# Patient Record
Sex: FEMALE | HISPANIC OR LATINO | Employment: FULL TIME | URBAN - METROPOLITAN AREA
[De-identification: names, ages, dates, MRNs, and addresses within clinical notes are randomized per-mention and may not be internally consistent; named-entity substitution may affect disease eponyms.]

---

## 2018-06-06 ENCOUNTER — APPOINTMENT (OUTPATIENT)
Dept: RADIOLOGY | Facility: IMAGING CENTER | Age: 48
End: 2018-06-06
Attending: NURSE PRACTITIONER
Payer: COMMERCIAL

## 2018-06-06 ENCOUNTER — OFFICE VISIT (OUTPATIENT)
Dept: MEDICAL GROUP | Facility: CLINIC | Age: 48
End: 2018-06-06
Payer: COMMERCIAL

## 2018-06-06 VITALS
BODY MASS INDEX: 26.75 KG/M2 | HEIGHT: 63 IN | TEMPERATURE: 99.8 F | WEIGHT: 151 LBS | SYSTOLIC BLOOD PRESSURE: 110 MMHG | OXYGEN SATURATION: 98 % | HEART RATE: 100 BPM | DIASTOLIC BLOOD PRESSURE: 68 MMHG | RESPIRATION RATE: 16 BRPM

## 2018-06-06 DIAGNOSIS — R05.9 COUGHING: ICD-10-CM

## 2018-06-06 DIAGNOSIS — H66.003 ACUTE SUPPURATIVE OTITIS MEDIA OF BOTH EARS WITHOUT SPONTANEOUS RUPTURE OF TYMPANIC MEMBRANES, RECURRENCE NOT SPECIFIED: ICD-10-CM

## 2018-06-06 DIAGNOSIS — J40 BRONCHITIS: ICD-10-CM

## 2018-06-06 PROCEDURE — 99213 OFFICE O/P EST LOW 20 MIN: CPT | Performed by: NURSE PRACTITIONER

## 2018-06-06 PROCEDURE — 71046 X-RAY EXAM CHEST 2 VIEWS: CPT | Mod: TC | Performed by: NURSE PRACTITIONER

## 2018-06-06 RX ORDER — PROMETHAZINE HYDROCHLORIDE AND CODEINE PHOSPHATE 6.25; 1 MG/5ML; MG/5ML
5 SYRUP ORAL 4 TIMES DAILY PRN
COMMUNITY
End: 2018-11-27

## 2018-06-06 RX ORDER — BENZONATATE 100 MG/1
100 CAPSULE ORAL 3 TIMES DAILY PRN
COMMUNITY
End: 2018-11-27

## 2018-06-06 RX ORDER — ALBUTEROL SULFATE 90 UG/1
2 AEROSOL, METERED RESPIRATORY (INHALATION) EVERY 6 HOURS PRN
Qty: 8.5 G | Refills: 0 | Status: SHIPPED | OUTPATIENT
Start: 2018-06-06 | End: 2018-11-27

## 2018-06-06 RX ORDER — METHYLPREDNISOLONE 4 MG/1
TABLET ORAL
Qty: 21 TAB | Refills: 0 | Status: SHIPPED | OUTPATIENT
Start: 2018-06-06 | End: 2018-11-27

## 2018-06-06 RX ORDER — AMOXICILLIN AND CLAVULANATE POTASSIUM 875; 125 MG/1; MG/1
1 TABLET, FILM COATED ORAL 2 TIMES DAILY
Qty: 20 TAB | Refills: 0 | Status: SHIPPED | OUTPATIENT
Start: 2018-06-06 | End: 2018-06-16

## 2018-06-06 RX ORDER — ALBUTEROL SULFATE 90 UG/1
2 AEROSOL, METERED RESPIRATORY (INHALATION) EVERY 6 HOURS PRN
Qty: 8.5 G | Refills: 0 | Status: SHIPPED | OUTPATIENT
Start: 2018-06-06 | End: 2018-06-06

## 2018-06-06 RX ORDER — AZITHROMYCIN 250 MG/1
250 TABLET, FILM COATED ORAL DAILY
COMMUNITY
End: 2018-06-06

## 2018-06-06 ASSESSMENT — ENCOUNTER SYMPTOMS
WHEEZING: 0
CHILLS: 0
HEADACHES: 1
SINUS PAIN: 0
SPUTUM PRODUCTION: 1
EYE PAIN: 0
FEVER: 1
SORE THROAT: 0
SHORTNESS OF BREATH: 1
DIZZINESS: 1
COUGH: 1

## 2018-06-06 NOTE — LETTER
[facility]  Alphonso Lowe M.D.  [address]  [place] [address]  Fax: [phone]   Authorization for Release/Disclosure of   Protected Health Information   Name: COLTON MELENDEZ : 1970 SSN: [id number]   Address: [address] [place] [address] Phone:    [phone] (home)    I authorize the entity listed below to release/disclose the PHI below to:   [facility]/Alphonso Lowe M.D. and TIEN Feldman   Provider or Entity Name:[facility]      Address [facility]  [address] [place] [address]      Phone:  [phone]    Fax:  [phone]   Reason for request: continuity of care   Information to be released:    [  ] LAST COLONOSCOPY,  including any PATH REPORT and follow-up  [  ] LAST FIT/COLOGUARD RESULT [  ] LAST DEXA  [  ] LAST MAMMOGRAM  [  ] LAST PAP  [  ] LAST LABS [  ] RETINA EXAM REPORT  [  ] IMMUNIZATION RECORDS  [ X ] Release all info      [  ] Check here and initial the line next to each item to release ALL health information INCLUDING  _____ Care and treatment for drug and / or alcohol abuse  _____ HIV testing, infection status, or AIDS  _____ Genetic Testing    DATES OF SERVICE OR TIME PERIOD TO BE DISCLOSED: 2018  I understand and acknowledge that:  * This Authorization may be revoked at any time by you in writing, except if your health information has already been used or disclosed.  * Your health information that will be used or disclosed as a result of you signing this authorization could be re-disclosed by the recipient. If this occurs, your re-disclosed health information may no longer be protected by State or Federal laws.  * You may refuse to sign this Authorization. Your refusal will not affect your ability to obtain treatment.  * This Authorization becomes effective upon signing and will  on (date) __________.      If no date is indicated, this Authorization will  one (1) year from the signature date.    Name:  Gely Cottrell    Signature:   Date:     6/6/2018       PLEASE FAX REQUESTED RECORDS BACK TO: [phone]

## 2018-06-06 NOTE — PROGRESS NOTES
Chief Complaint   Patient presents with   • URI     fever/non-productive cough/bilateral ear pain/sore throat/balance problem        HISTORY OF PRESENT ILLNESS: Patient is a 47 y.o. female established patient who presents today due to coughing for 6 weeks, mostly non-productive coughing with new symptoms of bilateral ear pain and sore throat, headache. Pt reports she has been to ER twice at [facility] and put on two course of antibiotics. She does not remember what is the first one but she is going to finish the second one, azithromycin today. Pt is also given night time codeine coughing medicine. She still has some but she does not feel it helps a lot.      Patient Active Problem List    Diagnosis Date Noted   • Abdominal pain, acute 08/27/2015   • Chest pain, rule out acute myocardial infarction 08/27/2015   • Diffuse pain 08/27/2015   • Hypocalcemia 08/27/2015   • Hyperparathyroidism due to vitamin D deficiency (HCC) 08/27/2015   • GERD (gastroesophageal reflux disease) 08/29/2011   • RUQ abdominal pain 08/29/2011   • History of cholecystectomy 08/29/2011       Allergies:Patient has no known allergies.    Current Outpatient Prescriptions   Medication Sig Dispense Refill   • promethazine-codeine (PHENERGAN-CODEINE) 6.25-10 MG/5ML Syrup Take 5 mL by mouth 4 times a day as needed for Cough.     • benzonatate (TESSALON) 100 MG Cap Take 100 mg by mouth 3 times a day as needed for Cough.     • amoxicillin-clavulanate (AUGMENTIN) 875-125 MG Tab Take 1 Tab by mouth 2 times a day for 10 days. 20 Tab 0   • albuterol 108 (90 Base) MCG/ACT Aero Soln inhalation aerosol Inhale 2 Puffs by mouth every 6 hours as needed. 8.5 g 0   • MethylPREDNISolone (MEDROL DOSEPAK) 4 MG Tablet Therapy Pack As directed on the packaging label. 21 Tab 0   • ibuprofen (MOTRIN) 200 MG Tab Take 600 mg by mouth every 6 hours as needed.     • calcium citrate (CALCITRATE) 950 MG Tab Take 1 Tab by mouth every day. 100 Tab 3   • vitamin D,  "Ergocalciferol, (DRISDOL) 71731 UNITS Cap capsule Take 1 Cap by mouth every 7 days. 30 Cap 0   • Cholecalciferol 1000 UNIT Cap Take 1 Cap by mouth every day. 100 Cap 3   • ondansetron (ZOFRAN) 4 MG TABS Take 1 Tab by mouth every 8 hours as needed for Nausea/Vomiting. 20 Each 0   • acetaminophen/caffeine/butalbital 300-40-50 mg (FIORICET) -40 MG CAPS capsule Take 1 Cap by mouth every 6 hours as needed for Headache. 30 Cap 0     No current facility-administered medications for this visit.        Social History   Substance Use Topics   • Smoking status: Never Smoker   • Smokeless tobacco: Never Used   • Alcohol use No       Family Status   Relation Status   • Mother Alive   • Father Alive     Family History   Problem Relation Age of Onset   • Diabetes Mother    • Hypertension Mother    • Hyperlipidemia Mother    • Stroke Mother    • Heart Disease Mother 60   • Hypertension Father    • Cancer Paternal Uncle      several uncles iwth stomach cancer of some kind         ROS:  Review of Systems   Constitutional: Positive for fever (last episode yesterday). Negative for chills.   HENT: Positive for ear pain. Negative for ear discharge, sinus pain and sore throat.    Eyes: Negative for pain.   Respiratory: Positive for cough, sputum production (very few) and shortness of breath (when severe coughing). Negative for wheezing.    Neurological: Positive for dizziness and headaches (after severe coughing).        Objective:     Blood pressure 110/68, pulse 100, temperature 37.7 °C (99.8 °F), resp. rate 16, height 1.6 m (5' 3\"), weight 68.5 kg (151 lb), SpO2 98 %.     Physical Exam:  Physical Exam   Constitutional: She is oriented to person, place, and time and well-developed, well-nourished, and in no distress.   HENT:   Head: Normocephalic and atraumatic.   Right Ear: There is tenderness. Tympanic membrane is bulging. Tympanic membrane is not injected. A middle ear effusion is present.   Left Ear: There is tenderness. " Tympanic membrane is bulging. Tympanic membrane is not injected.  No middle ear effusion.   Nose: Right sinus exhibits frontal sinus tenderness. Right sinus exhibits no maxillary sinus tenderness. Left sinus exhibits frontal sinus tenderness. Left sinus exhibits no maxillary sinus tenderness.   Mouth/Throat: No oropharyngeal exudate, posterior oropharyngeal edema, posterior oropharyngeal erythema or tonsillar abscesses.   Eyes: Conjunctivae are normal.   Neck: Neck supple. No JVD present.   Cardiovascular: Regular rhythm.    Pulmonary/Chest: Effort normal. No respiratory distress. She has no wheezes. She has no rales.   Diminished overall but pt is unable to take deep breath due to coughing   Musculoskeletal: She exhibits no edema.   Neurological: She is alert and oriented to person, place, and time.   Vitals reviewed.        Assessment/Plan:  1. Coughing  - DX-CHEST-2 VIEWS; Future  No pulmonary consolidation    - Obtain Results: Other (see comment) (recent ER visit note); Obtain Results From: [facility]  - amoxicillin-clavulanate (AUGMENTIN) 875-125 MG Tab; Take 1 Tab by mouth 2 times a day for 10 days.  Dispense: 20 Tab; Refill: 0    2. Bronchitis  - Obtain Results: Other (see comment) (recent ER visit note); Obtain Results From: [facility]  - amoxicillin-clavulanate (AUGMENTIN) 875-125 MG Tab; Take 1 Tab by mouth 2 times a day for 10 days.  Dispense: 20 Tab; Refill: 0  - albuterol 108 (90 Base) MCG/ACT Aero Soln inhalation aerosol; Inhale 2 Puffs by mouth every 6 hours as needed.  Dispense: 8.5 g; Refill: 0  - MethylPREDNISolone (MEDROL DOSEPAK) 4 MG Tablet Therapy Pack; As directed on the packaging label.  Dispense: 21 Tab; Refill: 0  - consider PFT if continue coughing    3. Acute suppurative otitis media of both ears without spontaneous rupture of tympanic membranes, recurrence not specified; sinusitis with sinus headache  - amoxicillin-clavulanate (AUGMENTIN) 875-125 MG Tab; Take 1 Tab by mouth 2  times a day for 10 days.  Dispense: 20 Tab; Refill: 0    Differential diagnoses and indications for immediate follow-up discussed with patient.    Instructed to return to clinic or nearest emergency department for any change in condition, further concerns, or worsening of symptoms.    The patient demonstrated a good understanding and agreed with the treatment plan.

## 2018-11-27 ENCOUNTER — HOSPITAL ENCOUNTER (EMERGENCY)
Facility: MEDICAL CENTER | Age: 48
End: 2018-11-27
Attending: EMERGENCY MEDICINE
Payer: COMMERCIAL

## 2018-11-27 ENCOUNTER — APPOINTMENT (OUTPATIENT)
Dept: RADIOLOGY | Facility: MEDICAL CENTER | Age: 48
End: 2018-11-27
Attending: EMERGENCY MEDICINE
Payer: COMMERCIAL

## 2018-11-27 VITALS
DIASTOLIC BLOOD PRESSURE: 67 MMHG | RESPIRATION RATE: 16 BRPM | BODY MASS INDEX: 25.93 KG/M2 | OXYGEN SATURATION: 98 % | HEIGHT: 64 IN | HEART RATE: 62 BPM | WEIGHT: 151.9 LBS | TEMPERATURE: 97.9 F | SYSTOLIC BLOOD PRESSURE: 114 MMHG

## 2018-11-27 DIAGNOSIS — N92.0 MENORRHAGIA WITH REGULAR CYCLE: ICD-10-CM

## 2018-11-27 DIAGNOSIS — R10.9 ABDOMINAL PAIN, ACUTE: ICD-10-CM

## 2018-11-27 LAB
ALBUMIN SERPL BCP-MCNC: 4.3 G/DL (ref 3.2–4.9)
ALBUMIN/GLOB SERPL: 1.6 G/DL
ALP SERPL-CCNC: 61 U/L (ref 30–99)
ALT SERPL-CCNC: 11 U/L (ref 2–50)
ANION GAP SERPL CALC-SCNC: 9 MMOL/L (ref 0–11.9)
APPEARANCE UR: CLEAR
AST SERPL-CCNC: 13 U/L (ref 12–45)
BACTERIA #/AREA URNS HPF: ABNORMAL /HPF
BASOPHILS # BLD AUTO: 0.5 % (ref 0–1.8)
BASOPHILS # BLD: 0.04 K/UL (ref 0–0.12)
BILIRUB SERPL-MCNC: 0.4 MG/DL (ref 0.1–1.5)
BILIRUB UR QL STRIP.AUTO: NEGATIVE
BUN SERPL-MCNC: 14 MG/DL (ref 8–22)
CALCIUM SERPL-MCNC: 9.1 MG/DL (ref 8.5–10.5)
CHLORIDE SERPL-SCNC: 105 MMOL/L (ref 96–112)
CO2 SERPL-SCNC: 23 MMOL/L (ref 20–33)
COLOR UR: YELLOW
CREAT SERPL-MCNC: 0.75 MG/DL (ref 0.5–1.4)
EOSINOPHIL # BLD AUTO: 0.14 K/UL (ref 0–0.51)
EOSINOPHIL NFR BLD: 1.6 % (ref 0–6.9)
EPI CELLS #/AREA URNS HPF: ABNORMAL /HPF
ERYTHROCYTE [DISTWIDTH] IN BLOOD BY AUTOMATED COUNT: 45 FL (ref 35.9–50)
GLOBULIN SER CALC-MCNC: 2.7 G/DL (ref 1.9–3.5)
GLUCOSE SERPL-MCNC: 94 MG/DL (ref 65–99)
GLUCOSE UR STRIP.AUTO-MCNC: NEGATIVE MG/DL
HCG SERPL QL: NEGATIVE
HCT VFR BLD AUTO: 40.8 % (ref 37–47)
HGB BLD-MCNC: 14.1 G/DL (ref 12–16)
HYALINE CASTS #/AREA URNS LPF: ABNORMAL /LPF
IMM GRANULOCYTES # BLD AUTO: 0.03 K/UL (ref 0–0.11)
IMM GRANULOCYTES NFR BLD AUTO: 0.3 % (ref 0–0.9)
KETONES UR STRIP.AUTO-MCNC: ABNORMAL MG/DL
LEUKOCYTE ESTERASE UR QL STRIP.AUTO: ABNORMAL
LYMPHOCYTES # BLD AUTO: 2.41 K/UL (ref 1–4.8)
LYMPHOCYTES NFR BLD: 27.7 % (ref 22–41)
MCH RBC QN AUTO: 32 PG (ref 27–33)
MCHC RBC AUTO-ENTMCNC: 34.6 G/DL (ref 33.6–35)
MCV RBC AUTO: 92.5 FL (ref 81.4–97.8)
MICRO URNS: ABNORMAL
MONOCYTES # BLD AUTO: 0.37 K/UL (ref 0–0.85)
MONOCYTES NFR BLD AUTO: 4.3 % (ref 0–13.4)
NEUTROPHILS # BLD AUTO: 5.7 K/UL (ref 2–7.15)
NEUTROPHILS NFR BLD: 65.6 % (ref 44–72)
NITRITE UR QL STRIP.AUTO: NEGATIVE
NRBC # BLD AUTO: 0 K/UL
NRBC BLD-RTO: 0 /100 WBC
PH UR STRIP.AUTO: 5 [PH]
PLATELET # BLD AUTO: 317 K/UL (ref 164–446)
PMV BLD AUTO: 9.7 FL (ref 9–12.9)
POTASSIUM SERPL-SCNC: 3.5 MMOL/L (ref 3.6–5.5)
PROT SERPL-MCNC: 7 G/DL (ref 6–8.2)
PROT UR QL STRIP: NEGATIVE MG/DL
RBC # BLD AUTO: 4.41 M/UL (ref 4.2–5.4)
RBC # URNS HPF: >150 /HPF
RBC UR QL AUTO: ABNORMAL
SODIUM SERPL-SCNC: 137 MMOL/L (ref 135–145)
SP GR UR STRIP.AUTO: 1.02
UROBILINOGEN UR STRIP.AUTO-MCNC: 0.2 MG/DL
WBC # BLD AUTO: 8.7 K/UL (ref 4.8–10.8)
WBC #/AREA URNS HPF: ABNORMAL /HPF

## 2018-11-27 PROCEDURE — 85025 COMPLETE CBC W/AUTO DIFF WBC: CPT

## 2018-11-27 PROCEDURE — 81001 URINALYSIS AUTO W/SCOPE: CPT

## 2018-11-27 PROCEDURE — 700102 HCHG RX REV CODE 250 W/ 637 OVERRIDE(OP): Performed by: EMERGENCY MEDICINE

## 2018-11-27 PROCEDURE — 99284 EMERGENCY DEPT VISIT MOD MDM: CPT

## 2018-11-27 PROCEDURE — 76830 TRANSVAGINAL US NON-OB: CPT

## 2018-11-27 PROCEDURE — A9270 NON-COVERED ITEM OR SERVICE: HCPCS | Performed by: EMERGENCY MEDICINE

## 2018-11-27 PROCEDURE — 80053 COMPREHEN METABOLIC PANEL: CPT

## 2018-11-27 PROCEDURE — 84703 CHORIONIC GONADOTROPIN ASSAY: CPT

## 2018-11-27 RX ORDER — HYDROCODONE BITARTRATE AND ACETAMINOPHEN 5; 325 MG/1; MG/1
1-2 TABLET ORAL EVERY 6 HOURS PRN
Qty: 12 TAB | Refills: 0 | Status: SHIPPED | OUTPATIENT
Start: 2018-11-27 | End: 2018-11-30

## 2018-11-27 RX ORDER — ACETAMINOPHEN 325 MG/1
650 TABLET ORAL ONCE
Status: COMPLETED | OUTPATIENT
Start: 2018-11-27 | End: 2018-11-27

## 2018-11-27 RX ORDER — HYDROCODONE BITARTRATE AND ACETAMINOPHEN 5; 325 MG/1; MG/1
1 TABLET ORAL ONCE
Status: COMPLETED | OUTPATIENT
Start: 2018-11-27 | End: 2018-11-27

## 2018-11-27 RX ADMIN — HYDROCODONE BITARTRATE AND ACETAMINOPHEN 1 TABLET: 5; 325 TABLET ORAL at 18:12

## 2018-11-27 RX ADMIN — ACETAMINOPHEN 650 MG: 325 TABLET, FILM COATED ORAL at 16:54

## 2018-11-27 ASSESSMENT — PAIN SCALES - GENERAL
PAINLEVEL_OUTOF10: 5
PAINLEVEL_OUTOF10: 7
PAINLEVEL_OUTOF10: 7

## 2018-11-28 NOTE — ED NOTES
Break RN: Medicated per MAR. States her daughter will be coming to pick her up. Verbalizes understanding of discharge and followup instructions. PIV removed, VSS. Given Rx and signed narcotic consent form. Ambulates with steady gait to discharge.

## 2018-11-28 NOTE — ED PROVIDER NOTES
"ED Provider Note    Scribed for Macario Rowland M.D. by Perez Jordan. 11/27/2018, 4:41 PM.    Primary care provider: Alphonso Lowe M.D.  Means of arrival: Walk-in  History obtained from: Patient  History limited by: None    CHIEF COMPLAINT  Chief Complaint   Patient presents with   • Vaginal Bleeding     *11 days. \"The toilet is full of blood and clots and it's not stopping.\"   • Abdominal Pain     \"Something in my vagina is pulling down.\"       HPI  Gely Cottrell is a 48 y.o. female who presents to the Emergency Department complaining of vaginal bleeding with associated lower abdominal pain onset 12 days ago. She states she is going through about 1 pad per hour. She believes the pain began first with gradual onset, followed by blood with large clots. She reports associated headache, dizziness, and light-headedness. She states her dizziness is exacerbated by sitting up. She denies any dysuria, dark or bloody stool, bleeding gums, or recent irregular or missed periods. She states she does not have an established OBGYN.    REVIEW OF SYSTEMS  Pertinent positives include vaginal bleeding, lower abdominal pain, headache, dizziness, and light-headedness. Pertinent negatives include no dysuria, dark or bloody stool, bleeding gums, or recent irregular or missed periods. All other systems negative.    PAST MEDICAL HISTORY   has a past medical history of Anemia (2002); GERD (gastroesophageal reflux disease) (8/29/2011); and RUQ abdominal pain (8/29/2011).    SURGICAL HISTORY   has a past surgical history that includes appendectomy; cholecystectomy; primary c section; tubal coagulation laparoscopic bilateral; and lumpectomy.    SOCIAL HISTORY  Social History   Substance Use Topics   • Smoking status: Never Smoker   • Smokeless tobacco: Never Used   • Alcohol use No      History   Drug Use No       FAMILY HISTORY  Family History   Problem Relation Age of Onset   • Diabetes Mother    • Hypertension Mother    • " "Hyperlipidemia Mother    • Stroke Mother    • Heart Disease Mother 60   • Hypertension Father    • Cancer Paternal Uncle         several uncles iwth stomach cancer of some kind       CURRENT MEDICATIONS  Home Medications     Reviewed by Ofelia Montes De Oca R.N. (Registered Nurse) on 11/27/18 at 1618  Med List Status: Complete   Medication Last Dose Status   ibuprofen (MOTRIN) 200 MG Tab 11/27/2018 Active                ALLERGIES  No Known Allergies    PHYSICAL EXAM  VITAL SIGNS: /80   Pulse 82   Temp 36.6 °C (97.9 °F) (Temporal)   Resp 16   Ht 1.626 m (5' 4\")   Wt 68.9 kg (151 lb 14.4 oz)   LMP 11/27/2018 (Exact Date)   SpO2 95%   BMI 26.07 kg/m²     Constitutional: Well developed, Well nourished, mild distress.   HENT: Normocephalic, Atraumatic.   Eyes: Conjunctiva normal, No discharge.   Cardiovascular: Normal heart rate, Normal rhythm, No murmurs, equal pulses.   Pulmonary: Normal breath sounds, No respiratory distress, No wheezing, No rales, No rhonchi.  Abdomen: Slight epigastric tenderness to palpation. Moderate suprapubic tenderness with slight guarding, no rebound.   : Female chaperone present during pelvic exam.  External exam is normal.  Patient has some clots in the vaginal vault, cervical ossea is closed and there is bleeding from the cervical office is dark in nature.  Patient has slight tenderness throughout no masses are felt  back: No CVA tenderness.   Musculoskeletal: No major deformities noted, No tenderness.   Skin: Warm, Dry, No erythema, No rash.   Neurologic: Alert & oriented x 3, Normal motor function,  No focal deficits noted.   Psychiatric: Affect normal, Judgment normal, Mood normal.     LABS  Results for orders placed or performed during the hospital encounter of 11/27/18   HCG Qual Serum   Result Value Ref Range    Beta-Hcg Qualitative Serum Negative Negative   CBC WITH DIFFERENTIAL   Result Value Ref Range    WBC 8.7 4.8 - 10.8 K/uL    RBC 4.41 4.20 - 5.40 M/uL    Hemoglobin " 14.1 12.0 - 16.0 g/dL    Hematocrit 40.8 37.0 - 47.0 %    MCV 92.5 81.4 - 97.8 fL    MCH 32.0 27.0 - 33.0 pg    MCHC 34.6 33.6 - 35.0 g/dL    RDW 45.0 35.9 - 50.0 fL    Platelet Count 317 164 - 446 K/uL    MPV 9.7 9.0 - 12.9 fL    Neutrophils-Polys 65.60 44.00 - 72.00 %    Lymphocytes 27.70 22.00 - 41.00 %    Monocytes 4.30 0.00 - 13.40 %    Eosinophils 1.60 0.00 - 6.90 %    Basophils 0.50 0.00 - 1.80 %    Immature Granulocytes 0.30 0.00 - 0.90 %    Nucleated RBC 0.00 /100 WBC    Neutrophils (Absolute) 5.70 2.00 - 7.15 K/uL    Lymphs (Absolute) 2.41 1.00 - 4.80 K/uL    Monos (Absolute) 0.37 0.00 - 0.85 K/uL    Eos (Absolute) 0.14 0.00 - 0.51 K/uL    Baso (Absolute) 0.04 0.00 - 0.12 K/uL    Immature Granulocytes (abs) 0.03 0.00 - 0.11 K/uL    NRBC (Absolute) 0.00 K/uL   COMP METABOLIC PANEL   Result Value Ref Range    Sodium 137 135 - 145 mmol/L    Potassium 3.5 (L) 3.6 - 5.5 mmol/L    Chloride 105 96 - 112 mmol/L    Co2 23 20 - 33 mmol/L    Anion Gap 9.0 0.0 - 11.9    Glucose 94 65 - 99 mg/dL    Bun 14 8 - 22 mg/dL    Creatinine 0.75 0.50 - 1.40 mg/dL    Calcium 9.1 8.5 - 10.5 mg/dL    AST(SGOT) 13 12 - 45 U/L    ALT(SGPT) 11 2 - 50 U/L    Alkaline Phosphatase 61 30 - 99 U/L    Total Bilirubin 0.4 0.1 - 1.5 mg/dL    Albumin 4.3 3.2 - 4.9 g/dL    Total Protein 7.0 6.0 - 8.2 g/dL    Globulin 2.7 1.9 - 3.5 g/dL    A-G Ratio 1.6 g/dL   URINALYSIS (UA)   Result Value Ref Range    Color Yellow     Character Clear     Specific Gravity 1.023 <1.035    Ph 5.0 5.0 - 8.0    Glucose Negative Negative mg/dL    Ketones Trace (A) Negative mg/dL    Protein Negative Negative mg/dL    Bilirubin Negative Negative    Urobilinogen, Urine 0.2 Negative    Nitrite Negative Negative    Leukocyte Esterase Trace (A) Negative    Occult Blood Large (A) Negative    Micro Urine Req Microscopic    URINE MICROSCOPIC (W/UA)   Result Value Ref Range    WBC 2-5 /hpf    RBC >150 (A) /hpf    Bacteria Few (A) None /hpf    Epithelial Cells Few /hpf     Hyaline Cast 3-5 (A) /lpf   ESTIMATED GFR   Result Value Ref Range    GFR If African American >60 >60 mL/min/1.73 m 2    GFR If Non African American >60 >60 mL/min/1.73 m 2      All labs reviewed by me.    RADIOLOGY  US-PELVIC TRANSVAGINAL ONLY   Final Result      Endometrial stripe measuring 6 mm in width.      No evidence of adnexal mass.        The radiologist's interpretation of all radiological studies have been reviewed by me.    COURSE & MEDICAL DECISION MAKING  Pertinent Labs & Imaging studies reviewed. (See chart for details)    4:41 PM - Patient seen and examined at bedside. Patient will be treated with Tylenol 650 mg. Ordered US transvaginal, HCG Qual, CBC, CMP, UA to evaluate her symptoms. The differential diagnoses include but are not limited to: ectopic pregnancy, irregular vaginal bleeding, leiomyoma.     Medical Decision Making: At this point time I think the patient most likely has abnormal vaginal bleeding secondary to possible perimenopause.  Ultrasound is negative for torsion or fibroids.  She is not having significant vaginal bleeding to the point to require any transfusion.  I have discussed with her that she needs a follow-up with a gynecologist and have referred her to the OB/GYN on call.  Patient does not have a fever no elevation of white blood cell count no signs of urinary tract infection.    I reviewed prescription monitoring program for patient's narcotic use before prescribing a scheduled drug.The patient will not drink alcohol nor drive with prescribed medications. The patient will return for new or worsening symptoms and is stable at the time of discharge.    The patient is referred to a primary physician for blood pressure management, diabetic screening, and for all other preventative health concerns.    In prescribing controlled substances to this patient, I certify that I have obtained and reviewed the medical history of Gely Cottrell. I have also made a good rachael effort  to obtain applicable records from other providers who have treated the patient and records did not demonstrate any increased risk of substance abuse that would prevent me from prescribing controlled substances.     I have conducted a physical exam and documented it. I have reviewed Ms. Cottrell’s prescription history as maintained by the [place] Prescription Monitoring Program.     I have assessed the patient’s risk for abuse, dependency, and addiction using the validated Opioid Risk Tool available at https://www.mdcalc.com/fminvu-cpee-sqph-ort-narcotic-abuse. 0  Given the above, I believe the benefits of controlled substance therapy outweigh the risks. The reasons for prescribing controlled substances include non-narcotic, oral analgesic alternatives have been inadequate for pain control. Accordingly, I have discussed the risk and benefits, treatment plan, and alternative therapies with the patient.         DISPOSITION:  Patient will be discharged home in stable condition.    FOLLOW UP:  Alphonso Lowe M.D.  [address]  [address]  [place] [address] [phone]    Schedule an appointment as soon as possible for a visit in 3 days      Maya Kong M.D.  [address]  [place] [address]  [phone]    Schedule an appointment as soon as possible for a visit         OUTPATIENT MEDICATIONS:  Discharge Medication List as of 11/27/2018  6:15 PM      START taking these medications    Details   HYDROcodone-acetaminophen (NORCO) 5-325 MG Tab per tablet Take 1-2 Tabs by mouth every 6 hours as needed for up to 3 days., Disp-12 Tab, R-0, Print Rx Paper, For 3 days               FINAL IMPRESSION  1. Menorrhagia with regular cycle    2. Abdominal pain, acute          IPerez (Cassandra), am scribing for, and in the presence of, Macario Rowland M.D.    Electronically signed by: Perez Kelly), 11/27/2018    Macario FLORENTINO M.D. personally performed the services described in  this documentation, as scribed by Perez Jordan in my presence, and it is both accurate and complete. C.    The note accurately reflects work and decisions made by me.  Macario Rowland  11/27/2018  7:04 PM

## 2018-11-28 NOTE — DISCHARGE INSTRUCTIONS
Return the emergency department if you have increasing pain, fever, heavy vaginal bleeding greater than a pad an hour, lightheadedness or pass out.

## 2018-11-28 NOTE — ED TRIAGE NOTES
"  Chief Complaint   Patient presents with   • Vaginal Bleeding     *11 days. \"The toilet is full of blood and clots and it's not stopping.\"   • Abdominal Pain     \"Something in my vagina is pulling down.\"     Ambulatory to triage for above.  Patient states that she is not on birth control and is sexually active.  LMP before this episode of bleeding was Oct 11.     /80   Pulse 82   Temp 36.6 °C (97.9 °F) (Temporal)   Resp 16   Ht 1.626 m (5' 4\")   Wt 68.9 kg (151 lb 14.4 oz)   LMP 11/27/2018 (Exact Date)   SpO2 95%   BMI 26.07 kg/m²     "

## 2020-06-17 ENCOUNTER — OFFICE VISIT (OUTPATIENT)
Dept: URGENT CARE | Facility: PHYSICIAN GROUP | Age: 50
End: 2020-06-17
Payer: COMMERCIAL

## 2020-06-17 ENCOUNTER — TELEPHONE (OUTPATIENT)
Dept: MEDICAL GROUP | Facility: MEDICAL CENTER | Age: 50
End: 2020-06-17

## 2020-06-17 ENCOUNTER — APPOINTMENT (OUTPATIENT)
Dept: RADIOLOGY | Facility: IMAGING CENTER | Age: 50
End: 2020-06-17
Attending: PHYSICIAN ASSISTANT
Payer: COMMERCIAL

## 2020-06-17 VITALS
OXYGEN SATURATION: 98 % | WEIGHT: 169.8 LBS | TEMPERATURE: 98.2 F | BODY MASS INDEX: 28.99 KG/M2 | HEART RATE: 84 BPM | DIASTOLIC BLOOD PRESSURE: 72 MMHG | SYSTOLIC BLOOD PRESSURE: 100 MMHG | RESPIRATION RATE: 20 BRPM | HEIGHT: 64 IN

## 2020-06-17 DIAGNOSIS — R05.9 COUGH: ICD-10-CM

## 2020-06-17 DIAGNOSIS — J98.4 PNEUMONITIS: ICD-10-CM

## 2020-06-17 PROCEDURE — 71046 X-RAY EXAM CHEST 2 VIEWS: CPT | Mod: TC | Performed by: PHYSICIAN ASSISTANT

## 2020-06-17 PROCEDURE — 99214 OFFICE O/P EST MOD 30 MIN: CPT | Performed by: PHYSICIAN ASSISTANT

## 2020-06-17 RX ORDER — DOXYCYCLINE HYCLATE 100 MG
100 TABLET ORAL 2 TIMES DAILY
Qty: 14 TAB | Refills: 0 | Status: SHIPPED | OUTPATIENT
Start: 2020-06-17 | End: 2020-06-24

## 2020-06-17 RX ORDER — PROMETHAZINE HYDROCHLORIDE, PHENYLEPHRINE HYDROCHLORIDE AND CODEINE PHOSPHATE 6.25; 5; 1 MG/5ML; MG/5ML; MG/5ML
5 SOLUTION ORAL EVERY 6 HOURS PRN
Qty: 1 BOTTLE | Refills: 0 | Status: SHIPPED | OUTPATIENT
Start: 2020-06-17 | End: 2020-06-24

## 2020-06-17 RX ORDER — BENZONATATE 100 MG/1
100 CAPSULE ORAL 3 TIMES DAILY PRN
Qty: 60 CAP | Refills: 0 | Status: SHIPPED | OUTPATIENT
Start: 2020-06-17 | End: 2020-07-28

## 2020-06-17 RX ORDER — METHYLPREDNISOLONE 4 MG/1
TABLET ORAL
Qty: 21 TAB | Refills: 0 | Status: SHIPPED | OUTPATIENT
Start: 2020-06-17 | End: 2020-07-28

## 2020-06-17 RX ORDER — ALBUTEROL SULFATE 90 UG/1
2 AEROSOL, METERED RESPIRATORY (INHALATION) EVERY 6 HOURS PRN
Qty: 8.5 G | Refills: 0 | Status: SHIPPED | OUTPATIENT
Start: 2020-06-17 | End: 2020-07-28

## 2020-06-17 ASSESSMENT — FIBROSIS 4 INDEX: FIB4 SCORE: 0.59

## 2020-06-17 ASSESSMENT — ENCOUNTER SYMPTOMS
WEAKNESS: 0
SHORTNESS OF BREATH: 1
NAUSEA: 0
RHINORRHEA: 0
MYALGIAS: 0
DIZZINESS: 0
HEMOPTYSIS: 0
FEVER: 0
ABDOMINAL PAIN: 0
TINGLING: 0
WHEEZING: 0
HEADACHES: 1
VOMITING: 1
COUGH: 1
CHILLS: 0
SORE THROAT: 0
HEARTBURN: 0

## 2020-06-17 NOTE — TELEPHONE ENCOUNTER
1. Caller Name: Gely               Call Back Number: [phone]  [facility] PCP or Specialty Provider: No          2.  In the last two weeks, has the patient had any new or worsening symptoms (not explained by alternative diagnosis)? Yes, the patient reports the following COVID-19 consistent symptoms: cough, sore throat, congestion or runny nose and headache.  Reports with coughing episodes having a difficult time breathing. Denies feeling short of breath at this time. Denies any chest pain. Reports symptoms have been present for 2 weeks.    3.  Does patient have any comoribidities? None     4.  Has the patient traveled in the last 14 days OR had any known contact with someone who is suspected or confirmed to have COVID-19?  No.    5. Disposition: Advised to go to     Note routed to [facility] Provider: No [facility] PCP

## 2020-06-17 NOTE — LETTER
[facility] [place]  [address]  [place] [address]     June 17, 2020    Patient: Gely Cottrell   YOB: 1970   Date of Visit: 6/17/2020       To Whom It May Concern:    Gely Cottrell was seen and treated in our department on 6/17/2020.  Please excuse from work from 6/17/2020 until 6/19/2020.  Please call with any questions or concerns at [phone].    Sincerely,     Jeremías Wiggins P.A.-C.

## 2020-06-17 NOTE — PROGRESS NOTES
Subjective:   Gely Cottrell is a 49 y.o. female who presents for Cough (persistent cough, itchy throat, vomiting (foamy), difficulty sleeping, x2 weeks )      Cough   Chronicity: Started 4 weeks ago progressively worsened over the last 2 weeks.  The patient has a constant cough which is nonproductive.  Cough is worse with any deep breathing or talking. The problem has been gradually worsening. The problem occurs constantly. The cough is non-productive. Associated symptoms include ear congestion (Right greater than left.), headaches, postnasal drip and shortness of breath (After coughing fits.). Pertinent negatives include no chest pain, chills, ear pain, fever, heartburn, hemoptysis, myalgias, nasal congestion, rash, rhinorrhea, sore throat or wheezing. Exacerbated by: Deep breathing or talking. She has tried OTC cough suppressant for the symptoms. The treatment provided no relief. Her past medical history is significant for bronchitis, environmental allergies and pneumonia.       Review of Systems   Constitutional: Negative for chills and fever.   HENT: Positive for postnasal drip. Negative for ear pain, rhinorrhea and sore throat.    Respiratory: Positive for cough and shortness of breath (After coughing fits.). Negative for hemoptysis and wheezing.    Cardiovascular: Negative for chest pain.   Gastrointestinal: Positive for vomiting (Clear foamy consistency after coughing fits.). Negative for abdominal pain, heartburn and nausea.   Musculoskeletal: Negative for myalgias.   Skin: Negative for rash.   Neurological: Positive for headaches. Negative for dizziness, tingling and weakness.   Endo/Heme/Allergies: Positive for environmental allergies.       Medications:    • ibuprofen Tabs    Allergies: Patient has no known allergies.    Problem List: Gely Cottrell has GERD (gastroesophageal reflux disease); RUQ abdominal pain; History of cholecystectomy; Abdominal pain, acute; Chest pain, rule out acute  "myocardial infarction; Diffuse pain; Hypocalcemia; and Hyperparathyroidism due to vitamin D deficiency (HCC) on their problem list.    Surgical History:  Past Surgical History:   Procedure Laterality Date   • APPENDECTOMY     • CHOLECYSTECTOMY     • LUMPECTOMY      2003   • PRIMARY C SECTION      repeat c section time 2   • TUBAL COAGULATION LAPAROSCOPIC BILATERAL         Past Social Hx: Gely Cottrell  reports that she has never smoked. She has never used smokeless tobacco. She reports current alcohol use. She reports that she does not use drugs.     Past Family Hx:  Gely Cottrell family history includes Cancer in her paternal uncle; Diabetes in her mother; Heart Disease (age of onset: 60) in her mother; Hyperlipidemia in her mother; Hypertension in her father and mother; Stroke in her mother.     Problem list, medications, and allergies reviewed by myself today in Epic.     Objective:     /72 (BP Location: Right arm, Patient Position: Sitting, BP Cuff Size: Adult)   Pulse 84   Temp 36.8 °C (98.2 °F) (Temporal)   Resp 20   Ht 1.626 m (5' 4\")   Wt 77 kg (169 lb 12.8 oz)   SpO2 98%   BMI 29.15 kg/m²     Physical Exam  Constitutional:       General: She is not in acute distress.     Appearance: Normal appearance. She is ill-appearing. She is not toxic-appearing or diaphoretic.   HENT:      Head: Normocephalic and atraumatic.      Right Ear: Ear canal and external ear normal.      Left Ear: Ear canal and external ear normal.      Ears:      Comments: Clear fluid appreciated behind the bilateral TMs.  No TM erythema or bulging noted.     Nose: Congestion and rhinorrhea present.      Mouth/Throat:      Mouth: Mucous membranes are moist.      Pharynx: Posterior oropharyngeal erythema present. No oropharyngeal exudate.      Comments: Postnasal drip appreciated.  No tonsillar edema no tonsillar exudates noted uvula midline nondeviated.  Eyes:      Conjunctiva/sclera: Conjunctivae normal.   Neck:    "   Musculoskeletal: Normal range of motion. No muscular tenderness.   Cardiovascular:      Rate and Rhythm: Normal rate and regular rhythm.      Pulses: Normal pulses.      Heart sounds: Normal heart sounds.   Pulmonary:      Effort: Pulmonary effort is normal.      Breath sounds: Normal breath sounds. No wheezing.      Comments: Continuous coughing fits throughout patient interview.  Coughing with every deep breath.  Abdominal:      Palpations: Abdomen is soft.      Tenderness: There is no abdominal tenderness.   Lymphadenopathy:      Cervical: No cervical adenopathy.   Skin:     General: Skin is warm and dry.      Capillary Refill: Capillary refill takes less than 2 seconds.   Neurological:      General: No focal deficit present.      Mental Status: She is alert and oriented to person, place, and time. Mental status is at baseline.   Psychiatric:         Mood and Affect: Mood normal.         Thought Content: Thought content normal.       Chest x-ray:  FINDINGS:  There are patchy right peripheral midlung airspace opacities. The left lung is clear.     The cardiac silhouette is normal in size.     There is no evidence of pleural effusion or pneumothorax.     Imaged osseous structures are grossly unremarkable.     IMPRESSION:    Patchy right peripheral midlung airspace opacity suggestive of pneumonitis.    Assessment/Plan:     Diagnosis and associated orders:   1. Cough    - DX-CHEST-2 VIEWS; Future  - albuterol 108 (90 Base) MCG/ACT Aero Soln inhalation aerosol; Inhale 2 Puffs by mouth every 6 hours as needed for Shortness of Breath.  Dispense: 8.5 g; Refill: 0  - doxycycline (VIBRAMYCIN) 100 MG Tab; Take 1 Tab by mouth 2 times a day for 7 days.  Dispense: 14 Tab; Refill: 0  - Promethazine-Phenyleph-Codeine (PHENERGAN VC CODEINE) 6.25-5-10 MG/5ML Syrup; Take 5 mL by mouth every 6 hours as needed for up to 7 days.  Dispense: 1 Bottle; Refill: 0  - methylPREDNISolone (MEDROL DOSEPAK) 4 MG Tablet Therapy Pack; Follow  schedule on package instructions.  Dispense: 21 Tab; Refill: 0  - benzonatate (TESSALON) 100 MG Cap; Take 1 Cap by mouth 3 times a day as needed for Cough.  Dispense: 60 Cap; Refill: 0    2. Pneumonitis    - albuterol 108 (90 Base) MCG/ACT Aero Soln inhalation aerosol; Inhale 2 Puffs by mouth every 6 hours as needed for Shortness of Breath.  Dispense: 8.5 g; Refill: 0  - methylPREDNISolone (MEDROL DOSEPAK) 4 MG Tablet Therapy Pack; Follow schedule on package instructions.  Dispense: 21 Tab; Refill: 0  - benzonatate (TESSALON) 100 MG Cap; Take 1 Cap by mouth 3 times a day as needed for Cough.  Dispense: 60 Cap; Refill: 0       Comments/MDM:       • Take medications as prescribed.  • Contingent antibiotic prescription given to patient to fill upon meeting criteria of guidelines discussed.   • Increase fluid intake.  • After visit summary provided with home care instructions.  • Instructions given on cough medicine do not take promethazine with codeine while at work only take at night before bed.  May take Tessalon Perles during the day.  • Red flag symptoms discussed with the patient at length today.  She understands if any of these appear to return to clinic or the nearest emergency department.           Differential diagnosis, natural history, supportive care, and indications for immediate follow-up discussed.    Advised the patient to follow-up with the primary care physician for recheck, reevaluation, and consideration of further management.    Please note that this dictation was created using voice recognition software. I have made reasonable attempt to correct obvious errors, but I expect that there are errors of grammar and possibly content that I did not discover before finalizing the note.    This note was electronically signed by CHRIS Wiggins PA-C

## 2020-06-17 NOTE — PATIENT INSTRUCTIONS
Bronchitis  Bronchitis is the body's way of reacting to injury and/or infection (inflammation) of the bronchi. Bronchi are the air tubes that extend from the windpipe into the lungs. If the inflammation becomes severe, it may cause shortness of breath.  CAUSES   Inflammation may be caused by:  · A virus.  · Germs (bacteria).  · Dust.  · Allergens.  · Pollutants and many other irritants.  The cells lining the bronchial tree are covered with tiny hairs (cilia). These constantly beat upward, away from the lungs, toward the mouth. This keeps the lungs free of pollutants. When these cells become too irritated and are unable to do their job, mucus begins to develop. This causes the characteristic cough of bronchitis. The cough clears the lungs when the cilia are unable to do their job. Without either of these protective mechanisms, the mucus would settle in the lungs. Then you would develop pneumonia.  Smoking is a common cause of bronchitis and can contribute to pneumonia. Stopping this habit is the single most important thing you can do to help yourself.  TREATMENT   · Your caregiver may prescribe an antibiotic if the cough is caused by bacteria. Also, medicines that open up your airways make it easier to breathe. Your caregiver may also recommend or prescribe an expectorant. It will loosen the mucus to be coughed up. Only take over-the-counter or prescription medicines for pain, discomfort, or fever as directed by your caregiver.  · Removing whatever causes the problem (smoking, for example) is critical to preventing the problem from getting worse.  · Cough suppressants may be prescribed for relief of cough symptoms.  · Inhaled medicines may be prescribed to help with symptoms now and to help prevent problems from returning.  · For those with recurrent (chronic) bronchitis, there may be a need for steroid medicines.  SEEK IMMEDIATE MEDICAL CARE IF:   · During treatment, you develop more pus-like mucus (purulent  sputum).  · You have a fever.  · Your baby is older than 3 months with a rectal temperature of 102° F (38.9° C) or higher.  · Your baby is 3 months old or younger with a rectal temperature of 100.4° F (38° C) or higher.  · You become progressively more ill.  · You have increased difficulty breathing, wheezing, or shortness of breath.  It is necessary to seek immediate medical care if you are elderly or sick from any other disease.  MAKE SURE YOU:   · Understand these instructions.  · Will watch your condition.  · Will get help right away if you are not doing well or get worse.  Document Released: 12/18/2006 Document Revised: 03/11/2013 Document Reviewed: 10/27/2009  [employer]® Patient Information ©2014 [employer].  Pneumonitis  Pneumonitis is inflammation of the lungs.   CAUSES   Many things can cause pneumonitis. These can include:   · A bacterial or viral infection. Pneumonitis due to an infection is usually called pneumonia.  · Work-related exposures, including farm and industrial work. Some substances that can cause pneumonitis include asbestos, silica, inhaled acids, or inhaled chlorine gas.    · Repeated exposure to bird feathers, bird feces, or other allergens.    · Medicine such as chemotherapy drugs, certain antibiotics, and some heart medicines.    · Radiation therapy.    · Exposure to mold. A hot tub, sauna, or home humidifier can have mold growing in it, even if it looks clean. The mold can be breathed in through water vapor.  · Breathing (aspirating) stomach contents, food, or liquids into the lungs.    SIGNS AND SYMPTOMS   · Cough.    · Shortness of breath or difficulty breathing.    · Fever.    · Decreased energy.    · Decreased appetite.    DIAGNOSIS   To diagnose pneumonitis, your health care provider will do a complete history and physical exam. Various tests may be ordered, such as:   · Pulmonary function test.    · Chest X-ray.    · CT scan of the lungs.    · Bronchoscopy.    · Lung biopsy.     TREATMENT   Treatment will depend on the cause of the pneumonitis. If the cause is exposure to a substance, avoiding further exposure to that substance will help reduce your symptoms. Possible medical treatments for pneumonitis include:   · Corticosteroid medicine to help decrease inflammation in the lungs.    · Antibiotic medicine to help fight a bacterial lung infection.    · Oxygen therapy if you are having difficulty breathing.    HOME CARE INSTRUCTIONS   · Avoid exposure to any substance identified as the cause of your pneumonitis.    · If you must continue to work with substances that can cause pneumonitis, wear a mask to protect your lungs.    · Only take over-the-counter or prescription medicine as directed by your health care provider.    · Do not smoke.    · If you use inhalers, keep them with you at all times.    · Follow up with your health care provider as directed.    SEEK IMMEDIATE MEDICAL CARE IF:   · You develop new or increased shortness of breath.    · You develop a blue color (cyanosis) under your fingernails.    · You have a fever.    MAKE SURE YOU:   · Understand these instructions.  · Will watch your condition.  · Will get help right away if you are not doing well or get worse.  This information is not intended to replace advice given to you by your health care provider. Make sure you discuss any questions you have with your health care provider.  Document Released: 06/07/2011 Document Revised: 08/20/2014 Document Reviewed: 06/09/2014  [employer]vier Interactive Patient Education © 2017 [employer]

## 2020-06-19 ENCOUNTER — TELEPHONE (OUTPATIENT)
Dept: URGENT CARE | Facility: PHYSICIAN GROUP | Age: 50
End: 2020-06-19

## 2020-06-19 DIAGNOSIS — J98.4 PNEUMONITIS: ICD-10-CM

## 2020-06-19 RX ORDER — DEXTROMETHORPHAN HYDROBROMIDE AND PROMETHAZINE HYDROCHLORIDE 15; 6.25 MG/5ML; MG/5ML
5 SYRUP ORAL EVERY 6 HOURS PRN
Qty: 100 ML | Refills: 0 | Status: SHIPPED | OUTPATIENT
Start: 2020-06-19 | End: 2020-07-28

## 2020-06-19 NOTE — TELEPHONE ENCOUNTER
This patient called the clinic and the provider that saw the patient previously is not in today.  Pharmacy does not have the prescribed cough syrup.  I reviewed the patient's allergies as well as her chart and I have sent in promethazine and dextromethorphan cough syrup to the pharmacy.  The patient access representative spoke directly and the patient agreed with this plan of care.

## 2020-07-28 ENCOUNTER — OFFICE VISIT (OUTPATIENT)
Dept: URGENT CARE | Facility: PHYSICIAN GROUP | Age: 50
End: 2020-07-28
Payer: COMMERCIAL

## 2020-07-28 VITALS
RESPIRATION RATE: 18 BRPM | TEMPERATURE: 98.1 F | SYSTOLIC BLOOD PRESSURE: 98 MMHG | HEIGHT: 64 IN | WEIGHT: 173.2 LBS | BODY MASS INDEX: 29.57 KG/M2 | OXYGEN SATURATION: 99 % | HEART RATE: 98 BPM | DIASTOLIC BLOOD PRESSURE: 80 MMHG

## 2020-07-28 DIAGNOSIS — R11.2 NON-INTRACTABLE VOMITING WITH NAUSEA, UNSPECIFIED VOMITING TYPE: ICD-10-CM

## 2020-07-28 DIAGNOSIS — R10.9 ABDOMINAL PAIN, UNSPECIFIED ABDOMINAL LOCATION: ICD-10-CM

## 2020-07-28 DIAGNOSIS — R07.89 CHEST PRESSURE: ICD-10-CM

## 2020-07-28 LAB

## 2020-07-28 PROCEDURE — 99215 OFFICE O/P EST HI 40 MIN: CPT | Mod: 25 | Performed by: PHYSICIAN ASSISTANT

## 2020-07-28 PROCEDURE — 81002 URINALYSIS NONAUTO W/O SCOPE: CPT | Performed by: PHYSICIAN ASSISTANT

## 2020-07-28 PROCEDURE — 93000 ELECTROCARDIOGRAM COMPLETE: CPT | Performed by: PHYSICIAN ASSISTANT

## 2020-07-28 PROCEDURE — 81025 URINE PREGNANCY TEST: CPT | Performed by: PHYSICIAN ASSISTANT

## 2020-07-28 RX ORDER — ONDANSETRON 4 MG/1
4 TABLET, ORALLY DISINTEGRATING ORAL ONCE
Status: COMPLETED | OUTPATIENT
Start: 2020-07-28 | End: 2020-07-28

## 2020-07-28 RX ADMIN — ONDANSETRON 4 MG: 4 TABLET, ORALLY DISINTEGRATING ORAL at 19:29

## 2020-07-28 ASSESSMENT — FIBROSIS 4 INDEX: FIB4 SCORE: 0.61

## 2020-07-28 ASSESSMENT — ENCOUNTER SYMPTOMS
DIARRHEA: 0
SHORTNESS OF BREATH: 0
VOMITING: 1
NAUSEA: 1
ABDOMINAL PAIN: 1
CONSTIPATION: 1
FEVER: 0

## 2020-07-29 NOTE — PROGRESS NOTES
Subjective:   Gely Cottrell is a 50 y.o. female who presents today with   Chief Complaint   Patient presents with   • Abdominal Pain     abdominal pain that goes all the way up to her throat.  Not having consistant BM.  Right shoulder pain.       Abdominal Pain   This is a new problem. Episode onset: 4 days. The onset quality is sudden. The problem occurs constantly. The problem has been gradually worsening. The pain is located in the RUQ and generalized abdominal region. The pain is moderate. The quality of the pain is aching. The abdominal pain does not radiate. Associated symptoms include constipation, nausea and vomiting. Pertinent negatives include no diarrhea or fever. The pain is relieved by nothing. She has tried nothing for the symptoms. The treatment provided no relief.     Patient states in [facility] 1 year ago she had similar symptoms and to get a colonoscopy and it was found that she had hemorrhoids and colitis at that time.  Patient states she feels extremely bloated and feels pressure going up into her chest.  Patient notes that chest pressure seems to get worse after eating and feels as though it is from her bloating from her abdomen.  She also does complain of some right shoulder pain.  PMH:  has a past medical history of Anemia (2002), GERD (gastroesophageal reflux disease) (8/29/2011), and RUQ abdominal pain (8/29/2011). She also has no past medical history of Breast cancer ([facility]).  MEDS:   Current Outpatient Medications:   •  ibuprofen (MOTRIN) 200 MG Tab, Take 600 mg by mouth every 6 hours as needed., Disp: , Rfl:     Current Facility-Administered Medications:   •  ondansetron (ZOFRAN ODT) dispertab 4 mg, 4 mg, Oral, Once, VEL GrullonAKARI.  ALLERGIES: No Known Allergies  SURGHX:   Past Surgical History:   Procedure Laterality Date   • APPENDECTOMY     • CHOLECYSTECTOMY     • LUMPECTOMY      2003   • PRIMARY C SECTION      repeat c section time 2   • TUBAL COAGULATION LAPAROSCOPIC  "BILATERAL       SOCHX:  reports that she has never smoked. She has never used smokeless tobacco. She reports current alcohol use. She reports that she does not use drugs.  FH: Reviewed with patient, not pertinent to this visit.       Review of Systems   Constitutional: Negative for fever.   Respiratory: Negative for shortness of breath.    Cardiovascular: Positive for chest pain (pressure).   Gastrointestinal: Positive for abdominal pain, constipation, nausea and vomiting. Negative for diarrhea.   All other systems reviewed and are negative.       Objective:   BP (!) 98/80   Pulse 98   Temp 36.7 °C (98.1 °F) (Temporal)   Resp 18   Ht 1.626 m (5' 4\")   Wt 78.6 kg (173 lb 3.2 oz)   SpO2 99%   BMI 29.73 kg/m²   Physical Exam  Vitals signs and nursing note reviewed.   Constitutional:       General: She is in acute distress.      Appearance: She is well-developed. She is ill-appearing. She is not toxic-appearing or diaphoretic.   HENT:      Head: Normocephalic and atraumatic.      Right Ear: Hearing normal.      Left Ear: Hearing normal.   Eyes:      Pupils: Pupils are equal, round, and reactive to light.   Cardiovascular:      Rate and Rhythm: Normal rate and regular rhythm.      Heart sounds: Normal heart sounds.   Pulmonary:      Effort: Pulmonary effort is normal.      Breath sounds: Normal breath sounds.   Abdominal:      General: Bowel sounds are decreased.      Palpations: There is hepatomegaly.      Tenderness: There is abdominal tenderness in the right upper quadrant. There is guarding. There is no right CVA tenderness or left CVA tenderness.      Hernia: No hernia is present.      Comments: Significant abdominal bloating   Musculoskeletal:      Comments: Normal movement in all 4 extremities. TTP to right anterior shoulder.   Skin:     General: Skin is warm and dry.   Neurological:      Mental Status: She is alert.      Coordination: Coordination normal.   Psychiatric:         Mood and Affect: Mood " normal.     PT vomiting while giving urine sample during visit today prior to giving Zofran.    EKG Normal sinus rhythm consistent with previous EKG from 2015. No noted acute changes.  UA NEG  PREG NEG  Zofran with little relief.  Assessment/Plan:   Assessment    1. Abdominal pain, unspecified abdominal location  - POCT Urinalysis  - POCT PREGNANCY    2. Non-intractable vomiting with nausea, unspecified vomiting type  - ondansetron (ZOFRAN ODT) dispertab 4 mg    3. Chest pressure  - EKG - Clinic Performed  Given acuity of patients abdominal pain and that we cannot obtain labs outpatient or CT imagining as she presents late in the day recommended she go to the ER for higher level of care at this time, as well as for pain control.   Patient agreeable with this plan and her daughter will take her now although I did offer to call for transport she declines.     Please note that this dictation was created using voice recognition software. I have made every reasonable attempt to correct obvious errors, but I expect that there are errors of grammar and possibly content that I did not discover before finalizing the note.    Saad Conway PA-C

## 2020-07-30 ENCOUNTER — TELEPHONE (OUTPATIENT)
Dept: MEDICAL GROUP | Facility: PHYSICIAN GROUP | Age: 50
End: 2020-07-30

## 2020-08-04 ENCOUNTER — TELEMEDICINE (OUTPATIENT)
Dept: MEDICAL GROUP | Facility: IMAGING CENTER | Age: 50
End: 2020-08-04
Payer: COMMERCIAL

## 2020-08-04 VITALS
WEIGHT: 170 LBS | BODY MASS INDEX: 29.02 KG/M2 | TEMPERATURE: 97 F | HEIGHT: 64 IN | DIASTOLIC BLOOD PRESSURE: 72 MMHG | HEART RATE: 81 BPM | SYSTOLIC BLOOD PRESSURE: 116 MMHG

## 2020-08-04 DIAGNOSIS — R14.0 ABDOMINAL BLOATING: ICD-10-CM

## 2020-08-04 DIAGNOSIS — R11.2 NON-INTRACTABLE VOMITING WITH NAUSEA, UNSPECIFIED VOMITING TYPE: ICD-10-CM

## 2020-08-04 DIAGNOSIS — R10.11 RUQ PAIN: ICD-10-CM

## 2020-08-04 PROCEDURE — 99204 OFFICE O/P NEW MOD 45 MIN: CPT | Mod: 95,CR | Performed by: FAMILY MEDICINE

## 2020-08-04 SDOH — HEALTH STABILITY: MENTAL HEALTH: HOW MANY STANDARD DRINKS CONTAINING ALCOHOL DO YOU HAVE ON A TYPICAL DAY?: 1 OR 2

## 2020-08-04 SDOH — HEALTH STABILITY: MENTAL HEALTH: HOW OFTEN DO YOU HAVE 6 OR MORE DRINKS ON ONE OCCASION?: NEVER

## 2020-08-04 SDOH — HEALTH STABILITY: MENTAL HEALTH: HOW OFTEN DO YOU HAVE A DRINK CONTAINING ALCOHOL?: MONTHLY OR LESS

## 2020-08-04 ASSESSMENT — PAIN SCALES - GENERAL: PAINLEVEL: 4=SLIGHT-MODERATE PAIN

## 2020-08-04 ASSESSMENT — FIBROSIS 4 INDEX: FIB4 SCORE: 0.61

## 2020-08-04 NOTE — PROGRESS NOTES
Telemedicine Visit: New Patient     This encounter was conducted via doxcimity.   Verbal consent was obtained. Patient's identity was verified. Patient aware this will be   billed the same as an in person evaluation.    Subjective:     Chief Complaint   Patient presents with   • Establish Care   • Abdominal Pain     RUQ, bloating, x 2 weeks, comes and goes   • Fatigue     trouble sleeping     Gely Cottrell is a 50 y.o. female with history of gerd, diffuse pain, cholecystectomy,  on virtual visit to discuss ruq pain with tenderness.   She did go to urgent care for this who recommended er for ct of abdomen.  recent heart attack. She has right shoulder pain. Reports that the ruq pain radiates up to her throat. With n/v  No fevers, chills, weight loss, yellow skin, fatigue, or diarrhea.   This has been bothering her for 5 years. She says in [facility] she was treated and felt better. Though does not know the exact meds. They gave her a diet that she can eat which helps.   She says that in [place] 1 year ago she was found to have colitis and hemorrhoids on colonoscopy. With bloating associated with food.   Etoh? Can go 4 months without drinking and then will drink a lot.   Herbs? None does drink a lot of tea lemon grass ermelinda many different types  She reports arthritis in her hands and deformity. Mom with dmII no ai disease in the family. Stiffness in the morning about a half hour.     ROS  See HPI  Constitutional: Negative for fever, chills and malaise/fatigue.   HENT: Negative for congestion, itchy watery eyes  Eyes: Negative for pain or sudden changes in vision  Respiratory: Negative for cough and shortness of breath.    Cardiovascular: Negative for leg swelling or chest pain  Gastrointestinal: Negative for nausea, vomiting, abdominal pain and diarrhea.   Genitourinary: Negative for dysuria and hematuria.   Skin: Negative for rash or concerning moles   Neurological: Negative for dizziness, focal weakness  "  Psychiatric/Behavioral: Negative for depression.  The patient is not nervous/anxious.    Musculoskeletal: no weakness or joint stiffness    No Known Allergies    Current medicines (including changes today)  Current Outpatient Medications   Medication Sig Dispense Refill   • ibuprofen (MOTRIN) 200 MG Tab Take 600 mg by mouth every 6 hours as needed.       No current facility-administered medications for this visit.        She  has a past medical history of Anemia (2002), GERD (gastroesophageal reflux disease) (8/29/2011), and RUQ abdominal pain (8/29/2011). She also has no past medical history of Breast cancer (HCC).  She  has a past surgical history that includes appendectomy; cholecystectomy; primary c section; tubal coagulation laparoscopic bilateral; and lumpectomy.      Family History   Problem Relation Age of Onset   • Diabetes Mother    • Hypertension Mother    • Hyperlipidemia Mother    • Stroke Mother    • Heart Disease Mother 60   • Hypertension Father    • Cancer Paternal Uncle         several uncles iwth stomach cancer of some kind     Family Status   Relation Name Status   • Mo  Alive   • Fa  Alive   • PUnc  (Not Specified)       Patient Active Problem List    Diagnosis Date Noted   • Abdominal pain, acute 08/27/2015   • Chest pain, rule out acute myocardial infarction 08/27/2015   • Diffuse pain 08/27/2015   • Hypocalcemia 08/27/2015   • Hyperparathyroidism due to vitamin D deficiency (HCC) 08/27/2015   • GERD (gastroesophageal reflux disease) 08/29/2011   • RUQ abdominal pain 08/29/2011   • History of cholecystectomy 08/29/2011          Objective:   Vitals obtained by patient:  /72   Pulse 81   Temp 36.1 °C (97 °F)   Ht 1.626 m (5' 4\")   Wt 77.1 kg (170 lb)   BMI 29.18 kg/m²     Physical Exam:  Constitutional: Alert, no distress, well-groomed.  Skin: No rashes in visible areas.  Eye: Round. Conjunctiva clear, lids normal. No icterus.   ENMT: Lips pink without lesions, good dentition, moist " mucous membranes. Phonation normal.  Neck: No masses, no thyromegaly. Moves freely without pain.  CV: Pulse as reported by patient  Respiratory: Unlabored respiratory effort, no cough or audible wheeze  Psych: Alert and oriented x3, normal affect and mood.   Neuro: symmetric face. Alert and oriented. Follows commands. No aphasia or dysarthria.    Labs:  Office Visit on 07/28/2020   Component Date Value Ref Range Status   • POC Color 07/28/2020 Yellow  Negative Final   • POC Appearance 07/28/2020 Clear  Negative Final   • POC Leukocyte Esterase 07/28/2020 Negative  Negative Final   • POC Nitrites 07/28/2020 Negative  Negative Final   • POC Urobiligen 07/28/2020 0.2  Negative (0.2) mg/dL Final   • POC Protein 07/28/2020 Negative  Negative mg/dL Final   • POC Urine PH 07/28/2020 5.5  5.0 - 8.0 Final   • POC Blood 07/28/2020 Negative  Negative Final   • POC Specific Gravity 07/28/2020 1.020  <1.005 - >1.030 Final   • POC Ketones 07/28/2020 Negative  Negative mg/dL Final   • POC Bilirubin 07/28/2020 Negative  Negative mg/dL Final   • POC Glucose 07/28/2020 Negative  Negative mg/dL Final   • POC Urine Pregnancy Test 07/28/2020 Negative  Negative Final   • Internal Control Positive 07/28/2020 Valid   Final   • Internal Control Negative 07/28/2020 Valid   Final       Imaging:   No results found.    Assessment and Plan:   The following treatment plan was discussed:   -EKG normal sinus rhythm UA negative pregnancy test negative  -Hepatomegaly and significant abdominal bloating noticed on exam at the urgent care this is virtual visit today  -Consider anca pending labs, TTG?  -patient to bring her medical records from [facility] workup given their recommendations made her feel better.   -patient looks well today  Problem List Items Addressed This Visit     None      Visit Diagnoses     RUQ pain        Relevant Orders    CBC WITH DIFFERENTIAL    Comp Metabolic Panel    FERRITIN    GAMMA GT (GGT)    HEP A AB IGM    HEP B SURFACE  ANTIGEN    HEP B CORE AB IGM    HEPATITIS B SURFACE AB TEST    Prothrombin Time    US-RUQ    TSH WITH REFLEX TO FT4    Non-intractable vomiting with nausea, unspecified vomiting type        Relevant Orders    CBC WITH DIFFERENTIAL    Comp Metabolic Panel    FERRITIN    GAMMA GT (GGT)    HEP A AB IGM    HEP B SURFACE ANTIGEN    HEP B CORE AB IGM    HEPATITIS B SURFACE AB TEST    Prothrombin Time    US-RUQ    TSH WITH REFLEX TO FT4    Abdominal bloating        Relevant Orders    CBC WITH DIFFERENTIAL    Comp Metabolic Panel    FERRITIN    GAMMA GT (GGT)    HEP A AB IGM    HEP B SURFACE ANTIGEN    HEP B CORE AB IGM    HEPATITIS B SURFACE AB TEST    Prothrombin Time    US-RUQ    TSH WITH REFLEX TO FT4          Follow-up: Return in about 2 weeks (around 8/18/2020).        Portions of this note may be dictated using Dragon NaturallySpeaking voice recognition software.  Variances in spelling and vocabulary are possible and unintentional.  Not all areas may be caught/corrected.  Please notify me if any discrepancies are noted or if the meaning of any statement is not correct/clear.

## 2020-08-10 ENCOUNTER — HOSPITAL ENCOUNTER (OUTPATIENT)
Dept: LAB | Facility: MEDICAL CENTER | Age: 50
End: 2020-08-10
Attending: FAMILY MEDICINE
Payer: COMMERCIAL

## 2020-08-10 DIAGNOSIS — R11.2 NON-INTRACTABLE VOMITING WITH NAUSEA, UNSPECIFIED VOMITING TYPE: ICD-10-CM

## 2020-08-10 DIAGNOSIS — R14.0 ABDOMINAL BLOATING: ICD-10-CM

## 2020-08-10 DIAGNOSIS — R10.11 RUQ PAIN: ICD-10-CM

## 2020-08-10 LAB
ALBUMIN SERPL BCP-MCNC: 4.3 G/DL (ref 3.2–4.9)
ALBUMIN/GLOB SERPL: 1.8 G/DL
ALP SERPL-CCNC: 90 U/L (ref 30–99)
ALT SERPL-CCNC: 106 U/L (ref 2–50)
ANION GAP SERPL CALC-SCNC: 16 MMOL/L (ref 7–16)
AST SERPL-CCNC: 56 U/L (ref 12–45)
BASOPHILS # BLD AUTO: 0.6 % (ref 0–1.8)
BASOPHILS # BLD: 0.05 K/UL (ref 0–0.12)
BILIRUB SERPL-MCNC: 0.3 MG/DL (ref 0.1–1.5)
BUN SERPL-MCNC: 12 MG/DL (ref 8–22)
CALCIUM SERPL-MCNC: 9.3 MG/DL (ref 8.5–10.5)
CHLORIDE SERPL-SCNC: 102 MMOL/L (ref 96–112)
CO2 SERPL-SCNC: 19 MMOL/L (ref 20–33)
CREAT SERPL-MCNC: 0.67 MG/DL (ref 0.5–1.4)
EOSINOPHIL # BLD AUTO: 0.33 K/UL (ref 0–0.51)
EOSINOPHIL NFR BLD: 4 % (ref 0–6.9)
ERYTHROCYTE [DISTWIDTH] IN BLOOD BY AUTOMATED COUNT: 45.4 FL (ref 35.9–50)
FASTING STATUS PATIENT QL REPORTED: NORMAL
FERRITIN SERPL-MCNC: 67.9 NG/ML (ref 10–291)
GGT SERPL-CCNC: 81 U/L (ref 7–34)
GLOBULIN SER CALC-MCNC: 2.4 G/DL (ref 1.9–3.5)
GLUCOSE SERPL-MCNC: 91 MG/DL (ref 65–99)
HAV IGM SERPL QL IA: NORMAL
HBV CORE IGM SER QL: NORMAL
HBV SURFACE AG SER QL: NORMAL
HCT VFR BLD AUTO: 45.5 % (ref 37–47)
HGB BLD-MCNC: 15.3 G/DL (ref 12–16)
IMM GRANULOCYTES # BLD AUTO: 0.03 K/UL (ref 0–0.11)
IMM GRANULOCYTES NFR BLD AUTO: 0.4 % (ref 0–0.9)
LYMPHOCYTES # BLD AUTO: 2.4 K/UL (ref 1–4.8)
LYMPHOCYTES NFR BLD: 29.4 % (ref 22–41)
MCH RBC QN AUTO: 31.4 PG (ref 27–33)
MCHC RBC AUTO-ENTMCNC: 33.6 G/DL (ref 33.6–35)
MCV RBC AUTO: 93.4 FL (ref 81.4–97.8)
MONOCYTES # BLD AUTO: 0.52 K/UL (ref 0–0.85)
MONOCYTES NFR BLD AUTO: 6.4 % (ref 0–13.4)
NEUTROPHILS # BLD AUTO: 4.84 K/UL (ref 2–7.15)
NEUTROPHILS NFR BLD: 59.2 % (ref 44–72)
NRBC # BLD AUTO: 0 K/UL
NRBC BLD-RTO: 0 /100 WBC
PLATELET # BLD AUTO: 324 K/UL (ref 164–446)
PMV BLD AUTO: 10 FL (ref 9–12.9)
POTASSIUM SERPL-SCNC: 4.1 MMOL/L (ref 3.6–5.5)
PROT SERPL-MCNC: 6.7 G/DL (ref 6–8.2)
RBC # BLD AUTO: 4.87 M/UL (ref 4.2–5.4)
SODIUM SERPL-SCNC: 137 MMOL/L (ref 135–145)
TSH SERPL DL<=0.005 MIU/L-ACNC: 4.29 UIU/ML (ref 0.38–5.33)
WBC # BLD AUTO: 8.2 K/UL (ref 4.8–10.8)

## 2020-08-10 PROCEDURE — 80053 COMPREHEN METABOLIC PANEL: CPT

## 2020-08-10 PROCEDURE — 86709 HEPATITIS A IGM ANTIBODY: CPT

## 2020-08-10 PROCEDURE — 82977 ASSAY OF GGT: CPT

## 2020-08-10 PROCEDURE — 36415 COLL VENOUS BLD VENIPUNCTURE: CPT

## 2020-08-10 PROCEDURE — 84443 ASSAY THYROID STIM HORMONE: CPT

## 2020-08-10 PROCEDURE — 82728 ASSAY OF FERRITIN: CPT

## 2020-08-10 PROCEDURE — 85025 COMPLETE CBC W/AUTO DIFF WBC: CPT

## 2020-08-10 PROCEDURE — 86705 HEP B CORE ANTIBODY IGM: CPT

## 2020-08-10 PROCEDURE — 87340 HEPATITIS B SURFACE AG IA: CPT

## 2020-08-14 ENCOUNTER — HOSPITAL ENCOUNTER (OUTPATIENT)
Facility: MEDICAL CENTER | Age: 50
End: 2020-08-14
Attending: FAMILY MEDICINE
Payer: COMMERCIAL

## 2020-08-14 LAB
INR PPP: 0.89 (ref 0.87–1.13)
PROTHROMBIN TIME: 12.3 SEC (ref 12–14.6)

## 2020-08-14 PROCEDURE — 85610 PROTHROMBIN TIME: CPT

## 2020-08-26 ENCOUNTER — NURSE TRIAGE (OUTPATIENT)
Dept: HEALTH INFORMATION MANAGEMENT | Facility: OTHER | Age: 50
End: 2020-08-26

## 2020-08-26 ENCOUNTER — TELEMEDICINE (OUTPATIENT)
Dept: MEDICAL GROUP | Facility: IMAGING CENTER | Age: 50
End: 2020-08-26
Payer: COMMERCIAL

## 2020-08-26 VITALS
TEMPERATURE: 97 F | HEIGHT: 64 IN | SYSTOLIC BLOOD PRESSURE: 123 MMHG | BODY MASS INDEX: 27.66 KG/M2 | DIASTOLIC BLOOD PRESSURE: 76 MMHG | HEART RATE: 99 BPM | WEIGHT: 162 LBS

## 2020-08-26 DIAGNOSIS — Z20.822 SUSPECTED COVID-19 VIRUS INFECTION: ICD-10-CM

## 2020-08-26 DIAGNOSIS — R06.02 SOB (SHORTNESS OF BREATH): ICD-10-CM

## 2020-08-26 DIAGNOSIS — R05.9 COUGH: ICD-10-CM

## 2020-08-26 PROCEDURE — 99213 OFFICE O/P EST LOW 20 MIN: CPT | Mod: 95,CR | Performed by: NURSE PRACTITIONER

## 2020-08-26 RX ORDER — ALBUTEROL SULFATE 90 UG/1
2 AEROSOL, METERED RESPIRATORY (INHALATION) EVERY 4 HOURS PRN
Qty: 1 EACH | Refills: 0 | Status: SHIPPED | OUTPATIENT
Start: 2020-08-26 | End: 2021-03-05

## 2020-08-26 RX ORDER — DOXYCYCLINE HYCLATE 100 MG/1
CAPSULE ORAL
COMMUNITY
Start: 2020-06-17 | End: 2020-08-26

## 2020-08-26 ASSESSMENT — FIBROSIS 4 INDEX: FIB4 SCORE: 0.82

## 2020-08-26 NOTE — TELEPHONE ENCOUNTER
1. Caller Name: Gely Cottrell                 Call Back Number: [phone]  [facility] PCP or Specialty Provider: Yes         2.  In the last two weeks, has the patient had any new or worsening symptoms (not explained by alternative diagnosis)? Yes, the patient reports the following COVID-19 consistent symptoms: cough, shortness of breath or difficulty breathing, fever of at least 100.4°F (38°C) or greater, chills, sore throat, muscle pain or body aches, fatigue, headache and new loss of taste or smell. Started midnight last night 8/25.      3.  Does patient have any comoribidities? None     4.  Has the patient traveled in the last 14 days OR had any known contact with someone who is suspected or confirmed to have COVID-19?  Yes in [place] on 8/19 (picking up cleaning supplies), covid exposure from daughter who tested + for covid, they were together on 8/22 & 8/23.  Daughter found out she was + on 8/25.  Son's throat burning on 8/24.      5. POTENTIAL PUI (MODERATE):Called PCP office to see if any availability for VV today vs UC visit.  PCP office to schedule visit w/APRN Ohm for this afternoon.      Note routed to [facility] Provider: SYLVIA only.

## 2020-08-26 NOTE — PROGRESS NOTES
Virtual Visit: Established Patient   This visit was conducted via [employer] using secure and encrypted videoconferencing technology. The patient was in a private location in the state of [place].    The patient's identity was confirmed and verbal consent was obtained for this virtual visit.  Patient is aware that this is a billable encounter.  Subjective:   CC:   Chief Complaint   Patient presents with   • Cough     started last night    • Chills     body aches    • Requesting Labs     covid, daughter tested positive      Gely Cottrell is a 50 y.o. female presenting for evaluation and management of:    States that she traveled to [place] to visit her daughter last week and has learned that her daughter has tested positive for COVID-19.  States starting last night 8/25/2020 she started to have fever and chills.  States today that symptoms have continued to worse including fever, chills, body aches, nasal congestion, shortness of breath, fatigue, headache, loss of taste and smell, and cough.  Denies N/V/D/C, rash, and/or sore throat.  States that she has been taking Tylenol 500 to 1000 mg for pain and fever.  States that her son who is 21 years old lives with her and is starting to have similar symptoms.  States that she called in sick today for work.    ROS   Constitutional: Denies night sweats, weight loss/gain or malaise. Fatigue, chills, fever, see HPI.   HENT: Denies sore throat, hearing loss, enlarged thyroid, or difficulty swallowing.  Nasal congestion, see HPI.  Eyes: Denies changes in vision, pain. Denies/ Does wear corrective wear.   Respiratory: Cough, SOB at rest or activity, see HPI.    Cardiovascular: Denies tachycardia, chest pain, palpitations, or leg swelling.   Gastrointestinal: Denies N/V/C/D, abdominal pain, loss appetite, reflux, or hematochezia.  Genitourinary: Denies difficulty voiding, dysuria, nocturia, or hematuria.   Skin: Negative for rash or worrisome moles.   Neurological:  "Negative for dizziness, focal weakness. Headaches, see HPI.   Endo/Heme/Allergies: Denies bruise/bleed easily, allergies.   Psychiatric/Behavioral: Denies depression, nervous/anxious, difficulty sleeping.    No Known Allergies    Current medicines (including changes today)  Current Outpatient Medications   Medication Sig Dispense Refill   • albuterol 108 (90 Base) MCG/ACT Aero Soln inhalation aerosol Inhale 2 Puffs by mouth every four hours as needed for Shortness of Breath. 1 Each 0   • ibuprofen (MOTRIN) 200 MG Tab Take 600 mg by mouth every 6 hours as needed.       No current facility-administered medications for this visit.      Patient Active Problem List    Diagnosis Date Noted   • Abdominal pain, acute 08/27/2015   • Chest pain, rule out acute myocardial infarction 08/27/2015   • Diffuse pain 08/27/2015   • Hypocalcemia 08/27/2015   • Hyperparathyroidism due to vitamin D deficiency (HCC) 08/27/2015   • GERD (gastroesophageal reflux disease) 08/29/2011   • RUQ abdominal pain 08/29/2011   • History of cholecystectomy 08/29/2011     Family History   Problem Relation Age of Onset   • Diabetes Mother    • Hypertension Mother    • Hyperlipidemia Mother    • Stroke Mother    • Heart Disease Mother 60   • Hypertension Father    • Cancer Paternal Uncle         several uncles iwth stomach cancer of some kind     She  has a past medical history of Anemia (2002), GERD (gastroesophageal reflux disease) (8/29/2011), and RUQ abdominal pain (8/29/2011). She also has no past medical history of Breast cancer (HCC).  She  has a past surgical history that includes appendectomy; cholecystectomy; primary c section; tubal coagulation laparoscopic bilateral; and lumpectomy.     Objective:   /76   Pulse 99   Temp 36.1 °C (97 °F)   Ht 1.626 m (5' 4\")   Wt 73.5 kg (162 lb)   BMI 27.81 kg/m²   Respiratory rate observed during visit: 18 bpm    Physical Exam:  Constitutional: Alert, ill-appearing, no distress.  Skin: No rashes " in visible areas.  Eye: Round. Conjunctiva clear, lids normal. No icterus.  Dark circles under eyes bilaterally.  ENMT: Lips pink without lesions, good dentition, moist mucous membranes. Phonation normal.  Neck: No masses, no thyromegaly. Moves freely without pain.  Respiratory: Unlabored respiratory effort, no cough or audible wheeze  Psych: Alert and oriented x3, normal affect and mood.     Assessment and Plan:   1. Suspected COVID-19 virus infection  2. Cough  3. SOB (shortness of breath)  This is a stable condition. Discussed with patient illness is viral in nature, and antibiotics are not indicated at this time.  Encouraged to increase or maintain hydration, receive plenty of rest, and take tylenol (500 mg to 1000 mg every 6 hours as tolerated, not to exceed 4g in 24 hours) for discomfort as tolerated. Encouraged patient to wash hands regularly and avoid sick contacts while supporting immune system with Vitamin C, Zinc, and Elderberry.  Instructed patient to social isolate while waiting to obtain COVID-19 testing and while waiting for test results.  Discussed with patient if her test is positive for COVID-19 she will need to social isolate for 14 days from onset of illness.  Discussed with patient if her COVID-19 test is negative she will need to social isolate for 7 days from onset and or 3 days from last fever, which ever is longer.  Verbalized understanding.  Discussed with patient that her son who lives with her needs to also social isolate until she has her test results due to presuming he has been exposed to COVID-19 as well.  Discussed drinking room temperature water and/or hot teas with slippery elm, licorice root and/or marshmallow root to soothe throat and cough, verbalized understanding.  Discussed with patient using albuterol inhaler intermittent as needed up to 2 puffs every 4-6 hours for SOB or cough.  Discussed possible side effects of use of albuterol inhaler, verbalized understanding.  Patient  instructed to seek emergency services if her symptoms worsen and/or she has any concerns and is unable to manage her care at home.  Letter written to excuse patient from work.  Instructed to follow-up with primary care within 7 days.    - COVID/SARS COV-2 PCR; Future  - albuterol 108 (90 Base) MCG/ACT Aero Soln inhalation aerosol; Inhale 2 Puffs by mouth every four hours as needed for Shortness of Breath.  Dispense: 1 Each; Refill: 0    Follow-up: Return in about 1 week (around 9/2/2020) for with PCP.

## 2020-08-26 NOTE — TELEPHONE ENCOUNTER
1. Caller Name: Gely Cottrell                 Call Back Number: [phone]  [facility] PCP or Specialty Provider: Yes         2.  In the last two weeks, has the patient had any new or worsening symptoms (not explained by alternative diagnosis)? Yes, the patient reports the following COVID-19 consistent symptoms: cough, shortness of breath or difficulty breathing, chills, muscle pain or body aches, fatigue, headache and new loss of taste or smell. Symptoms started last night.      3.  Does patient have any comoribidities? None     4.  Has the patient traveled in the last 14 days OR had any known contact with someone who is suspected or confirmed to have COVID-19?  Yes, traveled to [place] last week & exposed to daughter who recently tested + for covid.      5. Scheduled VV w/PCP in Dr. Hernandez's office, appt scheduled by PCP office.  VV for this afternoon.           Note routed to [facility] Provider: SYLVIA only.

## 2020-08-26 NOTE — LETTER
August 27, 2020    Gely Haylee Cottrell  [address] [facility] [place] [address]    Dear Gely:    Please excuse Cherie Cottrell from work on 8/26/2020.  It is my current recommendation that she self isolates at home for minimum of 7 days from her onset of illness and/or 3 days from last fever, which ever is longer.  Pending COVID testing, patient has been instructed to remain in social isolation until she is able to get her test done and results are received.  If patient's test is positive for COVID-19, it will be my further recommendation that she self isolates for a minimum of 14 days.    If you have any questions or concerns, please don't hesitate to call.    Sincerely,        ROCÍO Cutler.    Electronically Signed

## 2020-08-27 ENCOUNTER — HOSPITAL ENCOUNTER (OUTPATIENT)
Dept: LAB | Facility: MEDICAL CENTER | Age: 50
End: 2020-08-27
Attending: NURSE PRACTITIONER
Payer: COMMERCIAL

## 2020-08-27 DIAGNOSIS — Z20.822 SUSPECTED COVID-19 VIRUS INFECTION: ICD-10-CM

## 2020-08-27 LAB
COVID ORDER STATUS COVID19: NORMAL
SARS-COV-2 RNA RESP QL NAA+PROBE: NOTDETECTED
SPECIMEN SOURCE: NORMAL

## 2020-08-27 PROCEDURE — C9803 HOPD COVID-19 SPEC COLLECT: HCPCS

## 2020-08-27 PROCEDURE — U0003 INFECTIOUS AGENT DETECTION BY NUCLEIC ACID (DNA OR RNA); SEVERE ACUTE RESPIRATORY SYNDROME CORONAVIRUS 2 (SARS-COV-2) (CORONAVIRUS DISEASE [COVID-19]), AMPLIFIED PROBE TECHNIQUE, MAKING USE OF HIGH THROUGHPUT TECHNOLOGIES AS DESCRIBED BY CMS-2020-01-R: HCPCS

## 2021-03-05 ENCOUNTER — OFFICE VISIT (OUTPATIENT)
Dept: URGENT CARE | Facility: CLINIC | Age: 51
End: 2021-03-05
Payer: COMMERCIAL

## 2021-03-05 ENCOUNTER — HOSPITAL ENCOUNTER (OUTPATIENT)
Facility: MEDICAL CENTER | Age: 51
End: 2021-03-05
Attending: NURSE PRACTITIONER
Payer: COMMERCIAL

## 2021-03-05 VITALS
WEIGHT: 162 LBS | OXYGEN SATURATION: 96 % | RESPIRATION RATE: 16 BRPM | SYSTOLIC BLOOD PRESSURE: 124 MMHG | TEMPERATURE: 97.4 F | HEART RATE: 95 BPM | BODY MASS INDEX: 27.66 KG/M2 | HEIGHT: 64 IN | DIASTOLIC BLOOD PRESSURE: 62 MMHG

## 2021-03-05 DIAGNOSIS — J06.9 VIRAL UPPER RESPIRATORY TRACT INFECTION WITH COUGH: ICD-10-CM

## 2021-03-05 PROCEDURE — U0003 INFECTIOUS AGENT DETECTION BY NUCLEIC ACID (DNA OR RNA); SEVERE ACUTE RESPIRATORY SYNDROME CORONAVIRUS 2 (SARS-COV-2) (CORONAVIRUS DISEASE [COVID-19]), AMPLIFIED PROBE TECHNIQUE, MAKING USE OF HIGH THROUGHPUT TECHNOLOGIES AS DESCRIBED BY CMS-2020-01-R: HCPCS

## 2021-03-05 PROCEDURE — 99214 OFFICE O/P EST MOD 30 MIN: CPT | Performed by: NURSE PRACTITIONER

## 2021-03-05 PROCEDURE — U0005 INFEC AGEN DETEC AMPLI PROBE: HCPCS

## 2021-03-05 RX ORDER — BENZONATATE 100 MG/1
100 CAPSULE ORAL 3 TIMES DAILY PRN
Qty: 30 CAPSULE | Refills: 0 | Status: SHIPPED | OUTPATIENT
Start: 2021-03-05 | End: 2021-05-25

## 2021-03-05 ASSESSMENT — FIBROSIS 4 INDEX: FIB4 SCORE: 0.82

## 2021-03-06 DIAGNOSIS — J06.9 VIRAL UPPER RESPIRATORY TRACT INFECTION WITH COUGH: ICD-10-CM

## 2021-03-06 NOTE — PROGRESS NOTES
Chief Complaint   Patient presents with   • Cough     Headache,sore throat,fatigue,cough       HISTORY OF PRESENT ILLNESS: Patient is a 50 y.o. female who presents today due to symptoms which started four days ago. Pt reports a dry cough, rhinitis, sore throat, diarrhea, fever, chills, fatigue, malaise, and body aches. Denies chest pain, shortness of breath, or wheezing. Denies h/o asthma/copd/CAP. No immunocompromise. Has tried OTC cold medications without significant relief of symptoms. No recent ABX use. No other aggravating or alleviating factors. Reports no known exposure to COVID-19.       Patient Active Problem List    Diagnosis Date Noted   • Abdominal pain, acute 08/27/2015   • Chest pain, rule out acute myocardial infarction 08/27/2015   • Diffuse pain 08/27/2015   • Hypocalcemia 08/27/2015   • Hyperparathyroidism due to vitamin D deficiency (HCC) 08/27/2015   • GERD (gastroesophageal reflux disease) 08/29/2011   • RUQ abdominal pain 08/29/2011   • History of cholecystectomy 08/29/2011       Allergies:Patient has no known allergies.    Current Outpatient Medications Ordered in Epic   Medication Sig Dispense Refill   • benzonatate (TESSALON) 100 MG Cap Take 1 capsule by mouth 3 times a day as needed. 30 capsule 0   • ibuprofen (MOTRIN) 200 MG Tab Take 600 mg by mouth every 6 hours as needed.       No current Epic-ordered facility-administered medications on file.       Past Medical History:   Diagnosis Date   • Anemia 2002   • GERD (gastroesophageal reflux disease) 8/29/2011   • RUQ abdominal pain 8/29/2011       Social History     Tobacco Use   • Smoking status: Never Smoker   • Smokeless tobacco: Never Used   Substance Use Topics   • Alcohol use: Yes   • Drug use: No       Family Status   Relation Name Status   • Mo  Alive   • Fa  Alive   • PUnc  (Not Specified)     Family History   Problem Relation Age of Onset   • Diabetes Mother    • Hypertension Mother    • Hyperlipidemia Mother    • Stroke Mother   "  • Heart Disease Mother 60   • Hypertension Father    • Cancer Paternal Uncle         several uncles iwth stomach cancer of some kind       ROS:  Review of Systems   Constitutional: Positive for subjective fever, chills, fatigue, malaise. Negative for weight loss.  HENT: Positive for rhinitis, sore throat. Negative for ear pain, nosebleeds, and neck pain.    Eyes: Negative for vision changes.   Cardiovascular: Negative for chest pain, palpitations, orthopnea and leg swelling.   Respiratory: Positive for cough without sputum production. Negative for shortness of breath and wheezing.   Gastrointestinal: Positive for diarrhea. Negative for abdominal pain, vomiting or nausea.   Skin: Negative for rash, diaphoresis.     Exam:  /62   Pulse 95   Temp 36.3 °C (97.4 °F) (Temporal)   Resp 16   Ht 1.626 m (5' 4\")   Wt 73.5 kg (162 lb)   SpO2 96%   General: well-nourished, well-developed female in NAD  Head: normocephalic, atraumatic  Eyes: PERRLA, EOM within normal limits, no conjunctival injection, no scleral icterus, visual fields and acuity grossly intact.  Ears: normal shape and symmetry, no tenderness, no discharge. External canals are without any significant edema or erythema. Tympanic membranes are without any inflammation, no effusion. Gross auditory acuity is intact.  Nose: symmetrical without tenderness, mild discharge, erythema present bilateral nares.  Mouth/Throat: reasonable hygiene, no exudates or tonsillar enlargement. Mild erythema present.   Neck: no masses, range of motion within normal limits, no tracheal deviation.  Lymph: mild cervical adenopathy. No supraclavicular adenopathy.   Neuro: alert and oriented. Cranial nerves 1-12 grossly intact.   Cardiovascular: regular rate and rhythm without murmurs, rubs, or gallops. No edema.   Pulmonary: no distress. Chest is symmetrical with respiration. No wheezes, crackles, or rhonchi.   Musculoskeletal: appropriate muscle tone, gait is stable.  Skin: " warm, dry, intact, no clubbing, no cyanosis.   Psych: appropriate mood, affect, judgement.         Assessment/Plan:  1. Viral upper respiratory tract infection with cough  COVID/SARS CoV-2 PCR    benzonatate (TESSALON) 100 MG Cap           Discussed symptoms most likely viral, will test for COVID. Home quarantine advised. Discussed natural progression and sx care. Tessalon as needed.   Rest, increase fluids, hand and respiratory hygiene. May take OTC medications as directed for symptom relief. STRICT ER precautions.   Supportive care, differential diagnoses, and indications for immediate follow-up discussed with patient.   Pathogenesis of diagnosis discussed including typical length and natural progression.  Instructed to return to clinic or nearest emergency department for any change in condition, further concerns, or worsening of symptoms.  Patient states understanding of the plan of care and discharge instructions.          ROCÍO Oneill.

## 2021-05-25 ENCOUNTER — OFFICE VISIT (OUTPATIENT)
Dept: MEDICAL GROUP | Facility: IMAGING CENTER | Age: 51
End: 2021-05-25
Payer: COMMERCIAL

## 2021-05-25 VITALS
TEMPERATURE: 98.1 F | OXYGEN SATURATION: 97 % | HEIGHT: 64 IN | DIASTOLIC BLOOD PRESSURE: 72 MMHG | SYSTOLIC BLOOD PRESSURE: 110 MMHG | RESPIRATION RATE: 16 BRPM | WEIGHT: 165 LBS | BODY MASS INDEX: 28.17 KG/M2 | HEART RATE: 93 BPM

## 2021-05-25 DIAGNOSIS — G62.9 NEUROPATHY: ICD-10-CM

## 2021-05-25 DIAGNOSIS — J40 BRONCHITIS: ICD-10-CM

## 2021-05-25 DIAGNOSIS — J98.01 BRONCHOSPASM: ICD-10-CM

## 2021-05-25 DIAGNOSIS — Z12.31 ENCOUNTER FOR SCREENING MAMMOGRAM FOR BREAST CANCER: ICD-10-CM

## 2021-05-25 DIAGNOSIS — E21.1 HYPERPARATHYROIDISM DUE TO VITAMIN D DEFICIENCY (HCC): ICD-10-CM

## 2021-05-25 PROBLEM — R05.9 COUGH: Status: ACTIVE | Noted: 2021-05-25

## 2021-05-25 PROCEDURE — 94640 AIRWAY INHALATION TREATMENT: CPT | Performed by: PHYSICIAN ASSISTANT

## 2021-05-25 PROCEDURE — 99214 OFFICE O/P EST MOD 30 MIN: CPT | Mod: 25 | Performed by: PHYSICIAN ASSISTANT

## 2021-05-25 RX ORDER — IPRATROPIUM BROMIDE AND ALBUTEROL SULFATE 2.5; .5 MG/3ML; MG/3ML
3 SOLUTION RESPIRATORY (INHALATION) ONCE
Status: COMPLETED | OUTPATIENT
Start: 2021-05-25 | End: 2021-05-25

## 2021-05-25 RX ORDER — ALBUTEROL SULFATE 90 UG/1
2 AEROSOL, METERED RESPIRATORY (INHALATION) EVERY 4 HOURS PRN
Qty: 1 EACH | Refills: 1 | Status: SHIPPED | OUTPATIENT
Start: 2021-05-25 | End: 2022-04-20 | Stop reason: SDUPTHER

## 2021-05-25 RX ORDER — DEXAMETHASONE SODIUM PHOSPHATE 4 MG/ML
4 INJECTION, SOLUTION INTRA-ARTICULAR; INTRALESIONAL; INTRAMUSCULAR; INTRAVENOUS; SOFT TISSUE ONCE
Status: COMPLETED | OUTPATIENT
Start: 2021-05-25 | End: 2021-05-25

## 2021-05-25 RX ORDER — DEXAMETHASONE SODIUM PHOSPHATE 10 MG/ML
8 INJECTION INTRAMUSCULAR; INTRAVENOUS ONCE
Status: DISCONTINUED | OUTPATIENT
Start: 2021-05-25 | End: 2021-05-25

## 2021-05-25 RX ORDER — DEXAMETHASONE SODIUM PHOSPHATE 10 MG/ML
10 INJECTION INTRAMUSCULAR; INTRAVENOUS ONCE
Status: DISCONTINUED | OUTPATIENT
Start: 2021-05-25 | End: 2021-05-25

## 2021-05-25 RX ORDER — AZITHROMYCIN 250 MG/1
TABLET, FILM COATED ORAL
Qty: 6 TABLET | Refills: 0 | Status: SHIPPED | OUTPATIENT
Start: 2021-05-25 | End: 2021-07-06

## 2021-05-25 RX ADMIN — IPRATROPIUM BROMIDE AND ALBUTEROL SULFATE 3 ML: 2.5; .5 SOLUTION RESPIRATORY (INHALATION) at 17:31

## 2021-05-25 RX ADMIN — DEXAMETHASONE SODIUM PHOSPHATE 4 MG: 4 INJECTION, SOLUTION INTRA-ARTICULAR; INTRALESIONAL; INTRAMUSCULAR; INTRAVENOUS; SOFT TISSUE at 17:53

## 2021-05-25 RX ADMIN — DEXAMETHASONE SODIUM PHOSPHATE 4 MG: 4 INJECTION, SOLUTION INTRA-ARTICULAR; INTRALESIONAL; INTRAMUSCULAR; INTRAVENOUS; SOFT TISSUE at 17:54

## 2021-05-25 ASSESSMENT — PAIN SCALES - GENERAL: PAINLEVEL: 7=MODERATE-SEVERE PAIN

## 2021-05-25 ASSESSMENT — FIBROSIS 4 INDEX: FIB4 SCORE: 0.82

## 2021-05-26 ENCOUNTER — HOSPITAL ENCOUNTER (OUTPATIENT)
Dept: RADIOLOGY | Facility: MEDICAL CENTER | Age: 51
End: 2021-05-26
Attending: PHYSICIAN ASSISTANT
Payer: COMMERCIAL

## 2021-05-26 DIAGNOSIS — J98.01 BRONCHOSPASM: ICD-10-CM

## 2021-05-26 PROCEDURE — 71046 X-RAY EXAM CHEST 2 VIEWS: CPT

## 2021-05-26 NOTE — ASSESSMENT & PLAN NOTE
Patient admits to cough with frequent spasms for the past 2 months. She is coughing up clear phlegm.  She has had 8 negative Covid tests over the past 2 months.  She has gone to the urgent care and was treated with Tessalon without relief.  No fever, chills.  No sick contacts.  No smoking history or smoke exposure.

## 2021-05-26 NOTE — PATIENT INSTRUCTIONS
Once resulted, your lab/test/imaging results will show up automatically in your MyChart. Please wait for my interpretation and recommendations prior to viewing your results to avoid any unnecessary confusion or misinterpretation. I will address all of the lab values that I interpret as abnormal and message you accordingly on your MyChart. I will always send you a message on your labs even if they are normal. If you do not hear back from me within 5 business days after getting your labs drawn, then please send me a message on MyChart so I can obtain your labs (especially if you went to an outside lab - [facility], [facility], etc). If you have any additional questions or concerns beyond my interpretation of your results, please make an appointment with me to discuss in further detail.    Please only use the [employer] messaging system for questions regarding your most recent appointment or if advised to use otherwise (glucose or blood pressure reporting). If you have any new problems or concerns, you must make an appointment to discuss. This includes any referral requests.     Thank you,    Isis Harry PA-C ([place])  Physician Assistant Certified  [facility]

## 2021-05-26 NOTE — ASSESSMENT & PLAN NOTE
Patient mitts to numbness tingling in both of her hands that often extends up to her shoulders.  She states that it mostly happens at night.  No history of B12 deficiency or thyroid problems.

## 2021-05-26 NOTE — PROGRESS NOTES
Subjective:     CC:   Chief Complaint   Patient presents with   • Follow-Up   • Cough     for past two month, has had several tests, negative for covid     HPI:   Gely presents today with     Neuropathy  Patient mitts to numbness tingling in both of her hands that often extends up to her shoulders.  She states that it mostly happens at night.  No history of B12 deficiency or thyroid problems.    Cough  Patient admits to cough with frequent spasms for the past 2 months. She is coughing up clear phlegm.  She has had 8 negative Covid tests over the past 2 months.  She has gone to the urgent care and was treated with Tessalon without relief.  No fever, chills.  No sick contacts.  No smoking history or smoke exposure.      Past Medical History:   Diagnosis Date   • Anemia 2002   • GERD (gastroesophageal reflux disease) 8/29/2011   • RUQ abdominal pain 8/29/2011     Social History     Tobacco Use   • Smoking status: Never Smoker   • Smokeless tobacco: Never Used   Vaping Use   • Vaping Use: Never used   Substance Use Topics   • Alcohol use: Yes   • Drug use: No     Current Outpatient Medications Ordered in Epic   Medication Sig Dispense Refill   • Kebpdsvcz-Dglkfdij-PS (ROBITUSSIN ALLERGY/COUGH PO) Take  by mouth.     • albuterol 108 (90 Base) MCG/ACT Aero Soln inhalation aerosol Inhale 2 Puffs every four hours as needed for Shortness of Breath. 1 Each 1   • azithromycin (ZITHROMAX) 250 MG Tab Take zpack as directed 6 tablet 0     Current Facility-Administered Medications Ordered in Epic   Medication Dose Route Frequency Provider Last Rate Last Admin   • dexamethasone (DECADRON) injection (check route below) 8 mg  8 mg Oral Once Isis Harry, P.A.-C.       • dexamethasone (DECADRON) injection 4 mg  4 mg Other Once Isis Harry, P.A.-C.       • dexamethasone (DECADRON) injection 4 mg  4 mg Other Once Isis Harry, P.A.-C.         Patient has no known allergies.    Health Maintenance: Completed    ROS:  Gen: no  "fevers/chills, no changes in weight  Eyes: no changes in vision  Pulm: no sob, no cough  CV: no chest pain, no palpitations, no edema  GI: no nausea/vomiting, no diarrhea  Skin: no rash, no lesions  Neuro: no headaches, no numbness/tingling  Heme/Lymph: no easy bruising or bleeding  Objective:   Exam:  /72 (BP Location: Right arm, Patient Position: Sitting, BP Cuff Size: Adult)   Pulse 93   Temp 36.7 °C (98.1 °F) (Temporal)   Resp 16   Ht 1.626 m (5' 4\")   Wt 74.8 kg (165 lb)   SpO2 97%   BMI 28.32 kg/m²    Body mass index is 28.32 kg/m².    Gen: Alert and oriented, No apparent distress.  HEENT: Head atraumatic, normocephalic. Pupils equal and round.  Neck: Neck is supple without lymphadenopathy.   Lungs: Frequent bronchospasms during exam.  Reevaluated status post DuoNeb breathing treatment with significant improvement with bronchospasm.  No audible wheeze or rhonchi.  CV: Regular rate and rhythm. No murmurs, rubs, or gallops.  Ext: No clubbing, cyanosis, edema.    Assessment & Plan:     50 y.o. female with the following -     1. Bronchitis  We will treat patient with azithromycin due to longevity of symptoms.  8 mg of Decadron orally given today.  DuoNeb breathing treatment given to patient with improvement.  Will write prescription for albuterol 1 to 2 puffs every 4-6 hours around-the-clock for 3 days then as needed.  Chest x-ray if no improvement over 72 hours.  Over-the-counter cough medications recommended and increase fluids.  - dexamethasone (DECADRON) injection 4 mg  - dexamethasone (DECADRON) injection 4 mg    2. Bronchospasm  - CBC WITH DIFFERENTIAL; Future  - DX-CHEST-2 VIEWS; Future  - albuterol 108 (90 Base) MCG/ACT Aero Soln inhalation aerosol; Inhale 2 Puffs every four hours as needed for Shortness of Breath.  Dispense: 1 Each; Refill: 1  - azithromycin (ZITHROMAX) 250 MG Tab; Take zpack as directed  Dispense: 6 tablet; Refill: 0  - dexamethasone (DECADRON) injection (check route below) " 8 mg  - ipratropium-albuterol (DUONEB) nebulizer solution  - dexamethasone (DECADRON) injection 4 mg  - dexamethasone (DECADRON) injection 4 mg    3. Neuropathy  Check labs rule out metabolic etiology, evaluate next week at follow-up appointment  - VITAMIN B12; Future  - TSH WITH REFLEX TO FT4; Future  - Comp Metabolic Panel; Future  - HEMOGLOBIN A1C; Future  - Sed Rate; Future  - PTH INTACT (PTH ONLY); Future    4. Hyperparathyroidism due to vitamin D deficiency (HCC)  - PTH INTACT (PTH ONLY); Future    5. Encounter for screening mammogram for breast cancer  Per patient request.  Patient due for annual exam.  - MA-SCREENING MAMMO BILAT W/TOMOSYNTHESIS W/CAD; Future      Return in 1 week (on 6/1/2021) for Follow-up.    Isis Harry PA-C ([facility])  Physician Assistant Certified  [facility]    Please note that this dictation was created using voice recognition software. I have made every reasonable attempt to correct obvious errors, but I expect that there are errors of grammar and possibly content that I did not discover before finalizing the note.

## 2021-05-27 ENCOUNTER — HOSPITAL ENCOUNTER (OUTPATIENT)
Dept: LAB | Facility: MEDICAL CENTER | Age: 51
End: 2021-05-27
Attending: PHYSICIAN ASSISTANT
Payer: COMMERCIAL

## 2021-05-27 DIAGNOSIS — G62.9 NEUROPATHY: ICD-10-CM

## 2021-05-27 DIAGNOSIS — J98.01 BRONCHOSPASM: ICD-10-CM

## 2021-05-27 DIAGNOSIS — E21.1 HYPERPARATHYROIDISM DUE TO VITAMIN D DEFICIENCY (HCC): ICD-10-CM

## 2021-05-27 LAB
ALBUMIN SERPL BCP-MCNC: 4 G/DL (ref 3.2–4.9)
ALBUMIN/GLOB SERPL: 1.5 G/DL
ALP SERPL-CCNC: 90 U/L (ref 30–99)
ALT SERPL-CCNC: 63 U/L (ref 2–50)
ANION GAP SERPL CALC-SCNC: 9 MMOL/L (ref 7–16)
AST SERPL-CCNC: 38 U/L (ref 12–45)
BASOPHILS # BLD AUTO: 0.5 % (ref 0–1.8)
BASOPHILS # BLD: 0.06 K/UL (ref 0–0.12)
BILIRUB SERPL-MCNC: 0.3 MG/DL (ref 0.1–1.5)
BUN SERPL-MCNC: 13 MG/DL (ref 8–22)
CALCIUM SERPL-MCNC: 8.5 MG/DL (ref 8.5–10.5)
CHLORIDE SERPL-SCNC: 108 MMOL/L (ref 96–112)
CO2 SERPL-SCNC: 23 MMOL/L (ref 20–33)
CREAT SERPL-MCNC: 0.7 MG/DL (ref 0.5–1.4)
EOSINOPHIL # BLD AUTO: 0.29 K/UL (ref 0–0.51)
EOSINOPHIL NFR BLD: 2.2 % (ref 0–6.9)
ERYTHROCYTE [DISTWIDTH] IN BLOOD BY AUTOMATED COUNT: 43.7 FL (ref 35.9–50)
ERYTHROCYTE [SEDIMENTATION RATE] IN BLOOD BY WESTERGREN METHOD: 7 MM/HOUR (ref 0–25)
EST. AVERAGE GLUCOSE BLD GHB EST-MCNC: 117 MG/DL
GLOBULIN SER CALC-MCNC: 2.6 G/DL (ref 1.9–3.5)
GLUCOSE SERPL-MCNC: 91 MG/DL (ref 65–99)
HBA1C MFR BLD: 5.7 % (ref 4–5.6)
HCT VFR BLD AUTO: 42.6 % (ref 37–47)
HGB BLD-MCNC: 14.6 G/DL (ref 12–16)
IMM GRANULOCYTES # BLD AUTO: 0.11 K/UL (ref 0–0.11)
IMM GRANULOCYTES NFR BLD AUTO: 0.8 % (ref 0–0.9)
LYMPHOCYTES # BLD AUTO: 4.32 K/UL (ref 1–4.8)
LYMPHOCYTES NFR BLD: 33.3 % (ref 22–41)
MCH RBC QN AUTO: 31.4 PG (ref 27–33)
MCHC RBC AUTO-ENTMCNC: 34.3 G/DL (ref 33.6–35)
MCV RBC AUTO: 91.6 FL (ref 81.4–97.8)
MONOCYTES # BLD AUTO: 0.64 K/UL (ref 0–0.85)
MONOCYTES NFR BLD AUTO: 4.9 % (ref 0–13.4)
NEUTROPHILS # BLD AUTO: 7.55 K/UL (ref 2–7.15)
NEUTROPHILS NFR BLD: 58.3 % (ref 44–72)
NRBC # BLD AUTO: 0 K/UL
NRBC BLD-RTO: 0 /100 WBC
PLATELET # BLD AUTO: 332 K/UL (ref 164–446)
PMV BLD AUTO: 10 FL (ref 9–12.9)
POTASSIUM SERPL-SCNC: 3.5 MMOL/L (ref 3.6–5.5)
PROT SERPL-MCNC: 6.6 G/DL (ref 6–8.2)
PTH-INTACT SERPL-MCNC: 80.7 PG/ML (ref 14–72)
RBC # BLD AUTO: 4.65 M/UL (ref 4.2–5.4)
SODIUM SERPL-SCNC: 140 MMOL/L (ref 135–145)
T4 FREE SERPL-MCNC: 1.11 NG/DL (ref 0.93–1.7)
TSH SERPL DL<=0.005 MIU/L-ACNC: 8.33 UIU/ML (ref 0.38–5.33)
VIT B12 SERPL-MCNC: 802 PG/ML (ref 211–911)
WBC # BLD AUTO: 13 K/UL (ref 4.8–10.8)

## 2021-05-27 PROCEDURE — 85652 RBC SED RATE AUTOMATED: CPT

## 2021-05-27 PROCEDURE — 85025 COMPLETE CBC W/AUTO DIFF WBC: CPT

## 2021-05-27 PROCEDURE — 36415 COLL VENOUS BLD VENIPUNCTURE: CPT

## 2021-05-27 PROCEDURE — 83970 ASSAY OF PARATHORMONE: CPT

## 2021-05-27 PROCEDURE — 84443 ASSAY THYROID STIM HORMONE: CPT

## 2021-05-27 PROCEDURE — 82607 VITAMIN B-12: CPT

## 2021-05-27 PROCEDURE — 80053 COMPREHEN METABOLIC PANEL: CPT

## 2021-05-27 PROCEDURE — 83036 HEMOGLOBIN GLYCOSYLATED A1C: CPT

## 2021-05-27 PROCEDURE — 84439 ASSAY OF FREE THYROXINE: CPT

## 2021-05-28 ENCOUNTER — HOSPITAL ENCOUNTER (OUTPATIENT)
Dept: RADIOLOGY | Facility: MEDICAL CENTER | Age: 51
End: 2021-05-28
Attending: PHYSICIAN ASSISTANT
Payer: COMMERCIAL

## 2021-05-28 DIAGNOSIS — Z12.31 ENCOUNTER FOR SCREENING MAMMOGRAM FOR BREAST CANCER: ICD-10-CM

## 2021-05-28 PROCEDURE — 77063 BREAST TOMOSYNTHESIS BI: CPT

## 2021-06-01 PROBLEM — R74.01 TRANSAMINITIS: Status: ACTIVE | Noted: 2021-06-01

## 2021-06-01 PROBLEM — E87.6 HYPOKALEMIA: Status: ACTIVE | Noted: 2021-06-01

## 2021-06-01 PROBLEM — R79.89 ELEVATED TSH: Status: ACTIVE | Noted: 2021-06-01

## 2021-06-01 PROBLEM — R73.02 IGT (IMPAIRED GLUCOSE TOLERANCE): Status: ACTIVE | Noted: 2021-06-01

## 2021-06-03 ENCOUNTER — OFFICE VISIT (OUTPATIENT)
Dept: MEDICAL GROUP | Facility: IMAGING CENTER | Age: 51
End: 2021-06-03
Payer: COMMERCIAL

## 2021-06-03 VITALS
BODY MASS INDEX: 27.83 KG/M2 | OXYGEN SATURATION: 97 % | RESPIRATION RATE: 16 BRPM | SYSTOLIC BLOOD PRESSURE: 112 MMHG | HEART RATE: 110 BPM | HEIGHT: 64 IN | WEIGHT: 163 LBS | DIASTOLIC BLOOD PRESSURE: 72 MMHG | TEMPERATURE: 97.4 F

## 2021-06-03 DIAGNOSIS — J40 BRONCHITIS: ICD-10-CM

## 2021-06-03 DIAGNOSIS — R79.89 ELEVATED TSH: ICD-10-CM

## 2021-06-03 DIAGNOSIS — J98.01 BRONCHOSPASM: ICD-10-CM

## 2021-06-03 PROCEDURE — 99214 OFFICE O/P EST MOD 30 MIN: CPT | Mod: 25 | Performed by: PHYSICIAN ASSISTANT

## 2021-06-03 PROCEDURE — 94640 AIRWAY INHALATION TREATMENT: CPT | Performed by: PHYSICIAN ASSISTANT

## 2021-06-03 RX ORDER — BUDESONIDE 90 UG/1
2 AEROSOL, POWDER RESPIRATORY (INHALATION) 2 TIMES DAILY
Qty: 1 EACH | Refills: 0 | Status: SHIPPED | OUTPATIENT
Start: 2021-06-03 | End: 2021-06-21

## 2021-06-03 RX ORDER — PREDNISONE 20 MG/1
20 TABLET ORAL 2 TIMES DAILY
Qty: 10 TABLET | Refills: 0 | Status: SHIPPED | OUTPATIENT
Start: 2021-06-03 | End: 2021-06-08

## 2021-06-03 RX ORDER — DEXAMETHASONE SODIUM PHOSPHATE 4 MG/ML
4 INJECTION, SOLUTION INTRA-ARTICULAR; INTRALESIONAL; INTRAMUSCULAR; INTRAVENOUS; SOFT TISSUE ONCE
Status: COMPLETED | OUTPATIENT
Start: 2021-06-03 | End: 2021-06-03

## 2021-06-03 RX ORDER — METHYLPREDNISOLONE SODIUM SUCCINATE 125 MG/2ML
125 INJECTION, POWDER, LYOPHILIZED, FOR SOLUTION INTRAMUSCULAR; INTRAVENOUS ONCE
Status: DISCONTINUED | OUTPATIENT
Start: 2021-06-03 | End: 2021-06-03

## 2021-06-03 RX ORDER — PROMETHAZINE HYDROCHLORIDE, PHENYLEPHRINE HYDROCHLORIDE AND CODEINE PHOSPHATE 6.25; 5; 1 MG/5ML; MG/5ML; MG/5ML
5 SOLUTION ORAL EVERY 8 HOURS PRN
Qty: 280 ML | Refills: 0 | Status: SHIPPED | OUTPATIENT
Start: 2021-06-03 | End: 2021-06-10

## 2021-06-03 RX ORDER — IPRATROPIUM BROMIDE AND ALBUTEROL SULFATE 2.5; .5 MG/3ML; MG/3ML
3 SOLUTION RESPIRATORY (INHALATION) ONCE
Status: COMPLETED | OUTPATIENT
Start: 2021-06-03 | End: 2021-06-03

## 2021-06-03 RX ADMIN — DEXAMETHASONE SODIUM PHOSPHATE 4 MG: 4 INJECTION, SOLUTION INTRA-ARTICULAR; INTRALESIONAL; INTRAMUSCULAR; INTRAVENOUS; SOFT TISSUE at 14:53

## 2021-06-03 RX ADMIN — IPRATROPIUM BROMIDE AND ALBUTEROL SULFATE 3 ML: 2.5; .5 SOLUTION RESPIRATORY (INHALATION) at 14:36

## 2021-06-03 ASSESSMENT — FIBROSIS 4 INDEX: FIB4 SCORE: 0.71

## 2021-06-03 NOTE — PROGRESS NOTES
Subjective:     CC:   Chief Complaint   Patient presents with   • Cough     no change, medicine did not help       HPI:   Gely presents today for follow up of bronchitis    Bronchitis  Patient still with persistent cough and bronchospasm.  She is using albuterol every 4-6 hours around-the-clock.  She completed the azithromycin without relief.  She felt significantly improved last week when she was seen in the office and had the Decadron and breathing treatment.  No fever or chills.  Chest x-ray negative.      Past Medical History:   Diagnosis Date   • Anemia 2002   • GERD (gastroesophageal reflux disease) 8/29/2011   • RUQ abdominal pain 8/29/2011     Social History     Tobacco Use   • Smoking status: Never Smoker   • Smokeless tobacco: Never Used   Vaping Use   • Vaping Use: Never used   Substance Use Topics   • Alcohol use: Yes   • Drug use: No     Current Outpatient Medications Ordered in Epic   Medication Sig Dispense Refill   • predniSONE (DELTASONE) 20 MG Tab Take 1 tablet by mouth 2 times a day for 5 days. 10 tablet 0   • Promethazine-Phenyleph-Codeine (PHENERGAN VC CODEINE) 6.25-5-10 MG/5ML Syrup Take 5 mL by mouth every 8 hours as needed for up to 7 days. 280 mL 0   • budesonide (PULMICORT FLEXHALER) 90 MCG/ACT AEROSOL POWDER, BREATH ACTIVATED Inhale 2 Puffs 2 times a day for 14 days. 1 Each 0   • Ktavhbjuj-Kzklcotn-EF (ROBITUSSIN ALLERGY/COUGH PO) Take  by mouth.     • albuterol 108 (90 Base) MCG/ACT Aero Soln inhalation aerosol Inhale 2 Puffs every four hours as needed for Shortness of Breath. 1 Each 1   • azithromycin (ZITHROMAX) 250 MG Tab Take zpack as directed (Patient not taking: Reported on 6/3/2021) 6 tablet 0     No current Epic-ordered facility-administered medications on file.     Patient has no known allergies.    Health Maintenance: Completed    ROS:  Gen: no fevers/chills, no changes in weight  Eyes: no changes in vision  Pulm: no sob, + cough  CV: no chest pain, no palpitations, no  "edema  GI: no nausea/vomiting, no diarrhea  Skin: no rash, no lesions  Neuro: no headaches, no numbness/tingling  Heme/Lymph: no easy bruising or bleeding  Objective:   Exam:  /72 (BP Location: Right arm, Patient Position: Sitting, BP Cuff Size: Adult)   Pulse (!) 110   Temp 36.3 °C (97.4 °F) (Temporal)   Resp 16   Ht 1.626 m (5' 4\")   Wt 73.9 kg (163 lb)   SpO2 97%   BMI 27.98 kg/m²    Body mass index is 27.98 kg/m².    Gen: Alert and oriented, No apparent distress.  HEENT: Head atraumatic, normocephalic. Pupils equal and round.  Neck: Neck is supple without lymphadenopathy.   Lungs: Normal effort, CTA bilaterally, no wheezes, rhonchi, or rales.  Patient with bronchospasm with deep inspiration  CV: Regular rate and rhythm. No murmurs, rubs, or gallops.  Ext: No clubbing, cyanosis, edema.  Assessment & Plan:     50 y.o. female with the following -     1. Bronchitis  DuoNeb breathing treatment and 8 mg of Decadron orally given to patient in office with significant improvement.  Will start patient on course of prednisone 20 mg twice a day for 5 days.  Side effects reviewed with patient.  Will prescribe promethazine with codeine use sparingly.  Side effects and risk of abuse discussed with patient.  Also start her on budesonide inhaler 2 puffs twice a day rinse mouth out after use.  - ipratropium-albuterol (DUONEB) nebulizer solution  - predniSONE (DELTASONE) 20 MG Tab; Take 1 tablet by mouth 2 times a day for 5 days.  Dispense: 10 tablet; Refill: 0  - dexamethasone (DECADRON) injection 4 mg  - dexamethasone (DECADRON) injection 4 mg  - Promethazine-Phenyleph-Codeine (PHENERGAN VC CODEINE) 6.25-5-10 MG/5ML Syrup; Take 5 mL by mouth every 8 hours as needed for up to 7 days.  Dispense: 280 mL; Refill: 0  - budesonide (PULMICORT FLEXHALER) 90 MCG/ACT AEROSOL POWDER, BREATH ACTIVATED; Inhale 2 Puffs 2 times a day for 14 days.  Dispense: 1 Each; Refill: 0    2. Bronchospasm  - predniSONE (DELTASONE) 20 MG Tab; " Take 1 tablet by mouth 2 times a day for 5 days.  Dispense: 10 tablet; Refill: 0  - Promethazine-Phenyleph-Codeine (PHENERGAN VC CODEINE) 6.25-5-10 MG/5ML Syrup; Take 5 mL by mouth every 8 hours as needed for up to 7 days.  Dispense: 280 mL; Refill: 0  - budesonide (PULMICORT FLEXHALER) 90 MCG/ACT AEROSOL POWDER, BREATH ACTIVATED; Inhale 2 Puffs 2 times a day for 14 days.  Dispense: 1 Each; Refill: 0    3. Elevated TSH  Noted in previous labs.  May be related to acute illness.  Patient also with elevation of PTH, ALT, and WBC.  Would recommend a recheck in 1 month with Dr. Hernandez or per her discretion.      Return in about 4 weeks (around 7/1/2021) for Follow-up and lab recheck.    Isis Harry PA-C ([place])  Physician Assistant Certified  [facility]    Please note that this dictation was created using voice recognition software. I have made every reasonable attempt to correct obvious errors, but I expect that there are errors of grammar and possibly content that I did not discover before finalizing the note.

## 2021-06-03 NOTE — ASSESSMENT & PLAN NOTE
Patient still with persistent cough and bronchospasm.  She is using albuterol every 4-6 hours around-the-clock.  She completed the azithromycin without relief.  She felt significantly improved last week when she was seen in the office and had the Decadron and breathing treatment.  No fever or chills.  Chest x-ray negative.

## 2021-06-17 DIAGNOSIS — J98.01 BRONCHOSPASM: ICD-10-CM

## 2021-06-17 DIAGNOSIS — J40 BRONCHITIS: ICD-10-CM

## 2021-06-21 RX ORDER — BUDESONIDE 90 UG/1
AEROSOL, POWDER RESPIRATORY (INHALATION)
Qty: 1 EACH | Refills: 0 | Status: SHIPPED | OUTPATIENT
Start: 2021-06-21 | End: 2021-07-06

## 2021-07-06 ENCOUNTER — OFFICE VISIT (OUTPATIENT)
Dept: MEDICAL GROUP | Facility: IMAGING CENTER | Age: 51
End: 2021-07-06
Payer: COMMERCIAL

## 2021-07-06 VITALS
DIASTOLIC BLOOD PRESSURE: 82 MMHG | HEART RATE: 83 BPM | TEMPERATURE: 98.6 F | OXYGEN SATURATION: 96 % | SYSTOLIC BLOOD PRESSURE: 114 MMHG | RESPIRATION RATE: 12 BRPM | WEIGHT: 172 LBS | BODY MASS INDEX: 29.37 KG/M2 | HEIGHT: 64 IN

## 2021-07-06 DIAGNOSIS — R07.9 CHEST PAIN, UNSPECIFIED TYPE: ICD-10-CM

## 2021-07-06 DIAGNOSIS — E21.1 HYPERPARATHYROIDISM DUE TO VITAMIN D DEFICIENCY (HCC): ICD-10-CM

## 2021-07-06 DIAGNOSIS — E87.6 HYPOKALEMIA: ICD-10-CM

## 2021-07-06 DIAGNOSIS — R79.89 ELEVATED TSH: ICD-10-CM

## 2021-07-06 DIAGNOSIS — R74.01 TRANSAMINITIS: ICD-10-CM

## 2021-07-06 DIAGNOSIS — Z13.220 SCREENING FOR CHOLESTEROL LEVEL: ICD-10-CM

## 2021-07-06 DIAGNOSIS — R73.03 PRE-DIABETES: Chronic | ICD-10-CM

## 2021-07-06 DIAGNOSIS — R60.9 1+ PITTING EDEMA: ICD-10-CM

## 2021-07-06 DIAGNOSIS — K52.9 COLITIS: ICD-10-CM

## 2021-07-06 PROCEDURE — 99214 OFFICE O/P EST MOD 30 MIN: CPT | Performed by: FAMILY MEDICINE

## 2021-07-06 RX ORDER — BUDESONIDE 1 MG/2ML
INHALANT ORAL
COMMUNITY
Start: 2021-06-01 | End: 2024-02-12

## 2021-07-06 RX ORDER — PANTOPRAZOLE SODIUM 40 MG/1
40 TABLET, DELAYED RELEASE ORAL DAILY
Qty: 45 TABLET | Refills: 0 | Status: SHIPPED | OUTPATIENT
Start: 2021-07-06 | End: 2023-05-23 | Stop reason: SDUPTHER

## 2021-07-06 ASSESSMENT — PAIN SCALES - GENERAL: PAINLEVEL: 4=SLIGHT-MODERATE PAIN

## 2021-07-06 ASSESSMENT — PATIENT HEALTH QUESTIONNAIRE - PHQ9: CLINICAL INTERPRETATION OF PHQ2 SCORE: 0

## 2021-07-06 ASSESSMENT — FIBROSIS 4 INDEX: FIB4 SCORE: 0.71

## 2021-07-06 NOTE — PROGRESS NOTES
Chief Complaint   Patient presents with   • Follow-Up     1 month fu about GI problems and labs   • GI Problem     HPI:  50-year-old female with history of heartburn, hyperparathyroidism due to vitamin D deficiency, hypercalcemia, hyperkalemia, prediabetes, transaminitis previously seen in office with bronchitis failed antibiotic treatment given duo nebs in the office as well as 8 mg of Decadron and then sent home on prednisone burst. And started on inhaled steroid though she finished it. She denies shortness of breath or wheeze though will get cough about 5 times per week. It can be productive. This started 3 months ago.     Also noted to have elevated PTH ALT and WBC on prior labs.    Having some gi issues. H/o of colitis diagnosed in [facility]. Can get severe diarrhea watery in nature without blood or weight loss. With abdominal pain. No fevers chills. At times she gets abdominal fullness that resolves completely and associated with gas.     Also with hemorrhoids level 3-4 internal    etoh consumption: very light drinker. She has not drank in 2 months.     Her mother passed from dmII. Patient had been given steroids prior to most recent a1c of 5.7.     With chest pain after she lays flat feels like something is stuck in her throat has been going on for years. No jaw pain, fatigue, cold sweats, arm pain. Resolves in seconds.     Allergies   Allergen Reactions   • Seasonal      Current Outpatient Medications   Medication Sig Dispense Refill   • pantoprazole (PROTONIX) 40 MG Tablet Delayed Response Take 1 tablet by mouth every day. 45 tablet 0   • albuterol 108 (90 Base) MCG/ACT Aero Soln inhalation aerosol Inhale 2 Puffs every four hours as needed for Shortness of Breath. 1 Each 1   • Budesonide, Inhalation, 1 MG/2ML Suspension USE 1 VIAL VIA NEBILIZER EVERY 12 HOURS       No current facility-administered medications for this visit.     Social History     Tobacco Use   • Smoking status: Never Smoker   • Smokeless  "tobacco: Never Used   Vaping Use   • Vaping Use: Never used   Substance Use Topics   • Alcohol use: Yes     Comment: socially   • Drug use: No     Family History   Problem Relation Age of Onset   • Diabetes Mother    • Hypertension Mother    • Hyperlipidemia Mother    • Stroke Mother    • Heart Disease Mother 60   • Hypertension Father    • Cancer Paternal Uncle         several uncles iwth stomach cancer of some kind       /82 (BP Location: Right arm, Patient Position: Sitting, BP Cuff Size: Adult)   Pulse 83   Temp 37 °C (98.6 °F) (Temporal)   Resp 12   Ht 1.626 m (5' 4\")   Wt 78 kg (172 lb)   SpO2 96%  Body mass index is 29.52 kg/m².  Review of Systems   Constitutional: Negative for chills, fever and with malaise/fatigue.   HENT: Negative for hearing loss and tinnitus.    Eyes: Negative for double vision and pain.   Respiratory: w cough, noshortness of breath and wheezing.    Cardiovascular: Negative for chest pain, palpitations and with leg swelling.   Gastrointestinal: w abdominal pain, diarrhea,no  nausea and vomiting.   Genitourinary: Negative for dysuria and frequency.   Musculoskeletal: Negative for joint pain and myalgias.   Skin: Negative for itching and rash.   Neurological: Negative for dizziness and headaches.     With joint pain, dizziness, and abd pain    Physical Exam  Constitutional:       General: He is not in acute distress.     Appearance: He is not diaphoretic.   HENT:      Head: Normocephalic and atraumatic.   Eyes:      General: No scleral icterus.        Right eye: No discharge.         Left eye: No discharge.      Conjunctiva/sclera: Conjunctivae normal.      Pupils: Pupils are equal, round, and reactive to light.   Neck:      Thyroid: No thyromegaly.   Pulmonary:      Effort: Pulmonary effort is normal. No respiratory distress.   Abdominal:      General: There is no distension.   Skin:     General: Skin is warm and dry.   Neurological:      Mental Status: He is alert. "   Psychiatric:         Mood and Affect: Affect normal.         Judgment: Judgment normal.         All labs (last 1 month):   No visits with results within 1 Month(s) from this visit.   Latest known visit with results is:   Hospital Outpatient Visit on 05/27/2021   Component Date Value Ref Range Status   • Pth, Intact 05/27/2021 80.7* 14.0 - 72.0 pg/mL Final   • Sed Rate Westergren 05/27/2021 7  0 - 25 mm/hour Final   • Glycohemoglobin 05/27/2021 5.7* 4.0 - 5.6 % Final    Comment: Increased risk for diabetes:  5.7 -6.4%  Diabetes:  >6.4%  Glycemic control for adults with diabetes:  <7.0%    The above interpretations are per ADA guidelines.  Diagnosis  of diabetes mellitus on the basis of elevated Hemoglobin A1c  should be confirmed by repeating the Hb A1c test.     • Est Avg Glucose 05/27/2021 117  mg/dL Final    Comment: The eAG calculation is based on the A1c-Derived Daily Glucose  (ADAG) study.  See the ADA's website for additional information.     • Sodium 05/27/2021 140  135 - 145 mmol/L Final   • Potassium 05/27/2021 3.5* 3.6 - 5.5 mmol/L Final   • Chloride 05/27/2021 108  96 - 112 mmol/L Final   • Co2 05/27/2021 23  20 - 33 mmol/L Final   • Anion Gap 05/27/2021 9.0  7.0 - 16.0 Final   • Glucose 05/27/2021 91  65 - 99 mg/dL Final   • Bun 05/27/2021 13  8 - 22 mg/dL Final   • Creatinine 05/27/2021 0.70  0.50 - 1.40 mg/dL Final   • Calcium 05/27/2021 8.5  8.5 - 10.5 mg/dL Final   • AST(SGOT) 05/27/2021 38  12 - 45 U/L Final   • ALT(SGPT) 05/27/2021 63* 2 - 50 U/L Final   • Alkaline Phosphatase 05/27/2021 90  30 - 99 U/L Final   • Total Bilirubin 05/27/2021 0.3  0.1 - 1.5 mg/dL Final   • Albumin 05/27/2021 4.0  3.2 - 4.9 g/dL Final   • Total Protein 05/27/2021 6.6  6.0 - 8.2 g/dL Final   • Globulin 05/27/2021 2.6  1.9 - 3.5 g/dL Final   • A-G Ratio 05/27/2021 1.5  g/dL Final   • TSH 05/27/2021 8.330* 0.380 - 5.330 uIU/mL Final    Comment: Please note new reference ranges effective 12/14/2017 10:00 AM    Pregnant  Females, 1st Trimester  0.050-3.700  Pregnant Females, 2nd Trimester  0.310-4.350  Pregnant Females, 3rd Trimester  0.410-5.180     • Vitamin B12 -True Cobalamin 05/27/2021 802  211 - 911 pg/mL Final   • WBC 05/27/2021 13.0* 4.8 - 10.8 K/uL Final   • RBC 05/27/2021 4.65  4.20 - 5.40 M/uL Final   • Hemoglobin 05/27/2021 14.6  12.0 - 16.0 g/dL Final   • Hematocrit 05/27/2021 42.6  37.0 - 47.0 % Final   • MCV 05/27/2021 91.6  81.4 - 97.8 fL Final   • MCH 05/27/2021 31.4  27.0 - 33.0 pg Final   • MCHC 05/27/2021 34.3  33.6 - 35.0 g/dL Final   • RDW 05/27/2021 43.7  35.9 - 50.0 fL Final   • Platelet Count 05/27/2021 332  164 - 446 K/uL Final   • MPV 05/27/2021 10.0  9.0 - 12.9 fL Final   • Neutrophils-Polys 05/27/2021 58.30  44.00 - 72.00 % Final   • Lymphocytes 05/27/2021 33.30  22.00 - 41.00 % Final   • Monocytes 05/27/2021 4.90  0.00 - 13.40 % Final   • Eosinophils 05/27/2021 2.20  0.00 - 6.90 % Final   • Basophils 05/27/2021 0.50  0.00 - 1.80 % Final   • Immature Granulocytes 05/27/2021 0.80  0.00 - 0.90 % Final   • Nucleated RBC 05/27/2021 0.00  /100 WBC Final   • Neutrophils (Absolute) 05/27/2021 7.55* 2.00 - 7.15 K/uL Final    Includes immature neutrophils, if present.   • Lymphs (Absolute) 05/27/2021 4.32  1.00 - 4.80 K/uL Final   • Monos (Absolute) 05/27/2021 0.64  0.00 - 0.85 K/uL Final   • Eos (Absolute) 05/27/2021 0.29  0.00 - 0.51 K/uL Final   • Baso (Absolute) 05/27/2021 0.06  0.00 - 0.12 K/uL Final   • Immature Granulocytes (abs) 05/27/2021 0.11  0.00 - 0.11 K/uL Final   • NRBC (Absolute) 05/27/2021 0.00  K/uL Final   • Free T-4 05/27/2021 1.11  0.93 - 1.70 ng/dL Final    Please note new FT4 reference range effective 4/29/2020.   • GFR If  05/27/2021 >60  >60 mL/min/1.73 m 2 Final   • GFR If Non  05/27/2021 >60  >60 mL/min/1.73 m 2 Final       Lipids:   Lab Results   Component Value Date/Time    CHOLSTRLTOT 132 08/28/2015 12:36 AM    TRIGLYCERIDE 133 08/28/2015 12:36 AM     HDL 32 (A) 08/28/2015 12:36 AM    LDL 73 08/28/2015 12:36 AM       Imaging: No results found.    A/P  Given acute illness during last labs will repeat several of them.   Return in about 4 weeks (around 8/3/2021).    Problem List Items Addressed This Visit     Hyperparathyroidism due to vitamin D deficiency (HCC) (Chronic)     pth elevated. Will check vit d levels.          Relevant Orders    VITAMIN D,25 HYDROXY    Pre-diabetes (Chronic)    Elevated TSH    Relevant Orders    TSH WITH REFLEX TO FT4    Transaminitis    Relevant Orders    Comp Metabolic Panel    US-RUQ    Hypokalemia    Relevant Orders    Comp Metabolic Panel      Other Visit Diagnoses     Screening for cholesterol level        Relevant Orders    Lipid Profile    Chest pain, unspecified type        Relevant Medications    pantoprazole (PROTONIX) 40 MG Tablet Delayed Response    Other Relevant Orders    REFERRAL TO GASTROENTEROLOGY    1+ pitting edema        Relevant Orders    BTYPE NATRIURETIC PEPTIDE    Colitis        Relevant Orders    REFERRAL TO GASTROENTEROLOGY          Portions of this note may be dictated using Dragon [id number] films voice recognition software.  Variances in spelling and vocabulary are possible and unintentional.  Not all areas may be caught/corrected.  Please notify me if any discrepancies are noted or if the meaning of any statement is not correct/clear.

## 2021-07-14 PROBLEM — J40 BRONCHITIS: Status: RESOLVED | Noted: 2021-06-03 | Resolved: 2021-07-14

## 2021-07-14 PROBLEM — R73.03 PRE-DIABETES: Chronic | Status: ACTIVE | Noted: 2021-07-14

## 2021-07-20 ENCOUNTER — HOSPITAL ENCOUNTER (OUTPATIENT)
Dept: LAB | Facility: MEDICAL CENTER | Age: 51
End: 2021-07-20
Attending: FAMILY MEDICINE
Payer: COMMERCIAL

## 2021-07-20 DIAGNOSIS — R79.89 ELEVATED TSH: ICD-10-CM

## 2021-07-20 DIAGNOSIS — Z13.220 SCREENING FOR CHOLESTEROL LEVEL: ICD-10-CM

## 2021-07-20 DIAGNOSIS — R74.01 TRANSAMINITIS: ICD-10-CM

## 2021-07-20 DIAGNOSIS — R60.9 1+ PITTING EDEMA: ICD-10-CM

## 2021-07-20 DIAGNOSIS — E87.6 HYPOKALEMIA: ICD-10-CM

## 2021-07-20 DIAGNOSIS — E21.1 HYPERPARATHYROIDISM DUE TO VITAMIN D DEFICIENCY (HCC): ICD-10-CM

## 2021-07-20 LAB
25(OH)D3 SERPL-MCNC: 26 NG/ML (ref 30–100)
ALBUMIN SERPL BCP-MCNC: 4.1 G/DL (ref 3.2–4.9)
ALBUMIN/GLOB SERPL: 1.5 G/DL
ALP SERPL-CCNC: 88 U/L (ref 30–99)
ALT SERPL-CCNC: 35 U/L (ref 2–50)
ANION GAP SERPL CALC-SCNC: 12 MMOL/L (ref 7–16)
AST SERPL-CCNC: 31 U/L (ref 12–45)
BILIRUB SERPL-MCNC: 0.4 MG/DL (ref 0.1–1.5)
BUN SERPL-MCNC: 13 MG/DL (ref 8–22)
CALCIUM SERPL-MCNC: 9.1 MG/DL (ref 8.5–10.5)
CHLORIDE SERPL-SCNC: 105 MMOL/L (ref 96–112)
CHOLEST SERPL-MCNC: 204 MG/DL (ref 100–199)
CO2 SERPL-SCNC: 23 MMOL/L (ref 20–33)
CREAT SERPL-MCNC: 0.63 MG/DL (ref 0.5–1.4)
FASTING STATUS PATIENT QL REPORTED: NORMAL
GLOBULIN SER CALC-MCNC: 2.8 G/DL (ref 1.9–3.5)
GLUCOSE SERPL-MCNC: 100 MG/DL (ref 65–99)
HDLC SERPL-MCNC: 38 MG/DL
LDLC SERPL CALC-MCNC: 114 MG/DL
NT-PROBNP SERPL IA-MCNC: 7 PG/ML (ref 0–125)
POTASSIUM SERPL-SCNC: 4.2 MMOL/L (ref 3.6–5.5)
PROT SERPL-MCNC: 6.9 G/DL (ref 6–8.2)
SODIUM SERPL-SCNC: 140 MMOL/L (ref 135–145)
TRIGL SERPL-MCNC: 260 MG/DL (ref 0–149)
TSH SERPL DL<=0.005 MIU/L-ACNC: 2.04 UIU/ML (ref 0.38–5.33)

## 2021-07-20 PROCEDURE — 80053 COMPREHEN METABOLIC PANEL: CPT

## 2021-07-20 PROCEDURE — 80061 LIPID PANEL: CPT

## 2021-07-20 PROCEDURE — 83880 ASSAY OF NATRIURETIC PEPTIDE: CPT

## 2021-07-20 PROCEDURE — 82306 VITAMIN D 25 HYDROXY: CPT

## 2021-07-20 PROCEDURE — 84443 ASSAY THYROID STIM HORMONE: CPT

## 2021-07-20 PROCEDURE — 36415 COLL VENOUS BLD VENIPUNCTURE: CPT

## 2021-07-28 ENCOUNTER — HOSPITAL ENCOUNTER (OUTPATIENT)
Facility: MEDICAL CENTER | Age: 51
End: 2021-07-28
Attending: FAMILY MEDICINE
Payer: COMMERCIAL

## 2021-07-28 ENCOUNTER — OFFICE VISIT (OUTPATIENT)
Dept: MEDICAL GROUP | Facility: IMAGING CENTER | Age: 51
End: 2021-07-28
Payer: COMMERCIAL

## 2021-07-28 VITALS
DIASTOLIC BLOOD PRESSURE: 80 MMHG | HEIGHT: 64 IN | HEART RATE: 89 BPM | RESPIRATION RATE: 14 BRPM | SYSTOLIC BLOOD PRESSURE: 118 MMHG | BODY MASS INDEX: 28.85 KG/M2 | OXYGEN SATURATION: 99 % | TEMPERATURE: 98 F | WEIGHT: 169 LBS

## 2021-07-28 DIAGNOSIS — Z12.4 CERVICAL CANCER SCREENING: ICD-10-CM

## 2021-07-28 DIAGNOSIS — F43.12 CHRONIC POST-TRAUMATIC STRESS DISORDER (PTSD): ICD-10-CM

## 2021-07-28 DIAGNOSIS — Z12.11 COLON CANCER SCREENING: ICD-10-CM

## 2021-07-28 PROBLEM — R79.89 ELEVATED TSH: Status: RESOLVED | Noted: 2021-06-01 | Resolved: 2021-07-28

## 2021-07-28 PROCEDURE — 99396 PREV VISIT EST AGE 40-64: CPT | Performed by: FAMILY MEDICINE

## 2021-07-28 PROCEDURE — 87624 HPV HI-RISK TYP POOLED RSLT: CPT

## 2021-07-28 PROCEDURE — 88175 CYTOPATH C/V AUTO FLUID REDO: CPT

## 2021-07-28 ASSESSMENT — FIBROSIS 4 INDEX: FIB4 SCORE: 0.79

## 2021-07-28 ASSESSMENT — PAIN SCALES - GENERAL: PAINLEVEL: NO PAIN
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
## 2021-07-29 DIAGNOSIS — Z12.4 CERVICAL CANCER SCREENING: ICD-10-CM

## 2021-07-30 LAB
CYTOLOGY REG CYTOL: NORMAL
HPV HR 12 DNA CVX QL NAA+PROBE: NEGATIVE
HPV16 DNA SPEC QL NAA+PROBE: NEGATIVE
HPV18 DNA SPEC QL NAA+PROBE: NEGATIVE
SPECIMEN SOURCE: NORMAL

## 2021-09-03 ENCOUNTER — HOSPITAL ENCOUNTER (OUTPATIENT)
Facility: MEDICAL CENTER | Age: 51
End: 2021-09-03
Attending: NURSE PRACTITIONER
Payer: COMMERCIAL

## 2021-09-03 ENCOUNTER — OFFICE VISIT (OUTPATIENT)
Dept: URGENT CARE | Facility: CLINIC | Age: 51
End: 2021-09-03
Payer: COMMERCIAL

## 2021-09-03 VITALS
HEIGHT: 63 IN | SYSTOLIC BLOOD PRESSURE: 108 MMHG | DIASTOLIC BLOOD PRESSURE: 66 MMHG | WEIGHT: 167.6 LBS | BODY MASS INDEX: 29.7 KG/M2 | TEMPERATURE: 98.8 F | RESPIRATION RATE: 16 BRPM | HEART RATE: 102 BPM | OXYGEN SATURATION: 97 %

## 2021-09-03 DIAGNOSIS — Z20.822 SUSPECTED COVID-19 VIRUS INFECTION: ICD-10-CM

## 2021-09-03 PROCEDURE — 99213 OFFICE O/P EST LOW 20 MIN: CPT | Mod: CS | Performed by: NURSE PRACTITIONER

## 2021-09-03 PROCEDURE — U0005 INFEC AGEN DETEC AMPLI PROBE: HCPCS

## 2021-09-03 PROCEDURE — U0003 INFECTIOUS AGENT DETECTION BY NUCLEIC ACID (DNA OR RNA); SEVERE ACUTE RESPIRATORY SYNDROME CORONAVIRUS 2 (SARS-COV-2) (CORONAVIRUS DISEASE [COVID-19]), AMPLIFIED PROBE TECHNIQUE, MAKING USE OF HIGH THROUGHPUT TECHNOLOGIES AS DESCRIBED BY CMS-2020-01-R: HCPCS

## 2021-09-03 RX ORDER — EPINEPHRINE 0.3 MG/.3ML
INJECTION SUBCUTANEOUS
COMMUNITY
Start: 2021-08-31 | End: 2023-11-21

## 2021-09-03 ASSESSMENT — FIBROSIS 4 INDEX: FIB4 SCORE: 0.79

## 2021-09-03 NOTE — PROGRESS NOTES
Chief Complaint   Patient presents with   • Headache     pt is headache, fatigue x 3 days. pt exposed to covid        HISTORY OF PRESENT ILLNESS: Patient is a pleasant 51 y.o. female who presents today due to symptoms which started three days ago. Pt reports a mild generalized headache and fatigue. She denies a cough, rhinitis, sore throat, nausea, fever, chills, fatigue, malaise, body aches, chest pain, shortness of breath, or wheezing. Denies h/o asthma/copd/CAP. No immunocompromise. Has tried OTC cold medications without significant relief of symptoms. No recent ABX use. No other aggravating or alleviating factors. Reports positive exposure to COVID-19, has not been vaccinated.       Patient Active Problem List    Diagnosis Date Noted   • Pre-diabetes 07/14/2021   • IGT (impaired glucose tolerance) 06/01/2021   • Transaminitis 06/01/2021   • Hypokalemia 06/01/2021   • Cough 05/25/2021   • Neuropathy 05/25/2021   • Abdominal pain, acute 08/27/2015   • Chest pain, rule out acute myocardial infarction 08/27/2015   • Diffuse pain 08/27/2015   • Hypocalcemia 08/27/2015   • Hyperparathyroidism due to vitamin D deficiency (HCC) 08/27/2015   • GERD (gastroesophageal reflux disease) 08/29/2011   • RUQ abdominal pain 08/29/2011   • History of cholecystectomy 08/29/2011       Allergies:Seasonal    Current Outpatient Medications Ordered in Epic   Medication Sig Dispense Refill   • Budesonide, Inhalation, 1 MG/2ML Suspension USE 1 VIAL VIA NEBILIZER EVERY 12 HOURS     • pantoprazole (PROTONIX) 40 MG Tablet Delayed Response Take 1 tablet by mouth every day. 45 tablet 0   • albuterol 108 (90 Base) MCG/ACT Aero Soln inhalation aerosol Inhale 2 Puffs every four hours as needed for Shortness of Breath. 1 Each 1   • EPINEPHrine (EPIPEN) 0.3 MG/0.3ML Solution Auto-injector solution for injection        No current Epic-ordered facility-administered medications on file.       Past Medical History:   Diagnosis Date   • Anemia 2002  "  • GERD (gastroesophageal reflux disease) 8/29/2011   • RUQ abdominal pain 8/29/2011       Social History     Tobacco Use   • Smoking status: Never Smoker   • Smokeless tobacco: Never Used   Vaping Use   • Vaping Use: Never used   Substance Use Topics   • Alcohol use: Yes     Comment: socially   • Drug use: No       Family Status   Relation Name Status   • Mo  Alive   • Fa  Alive   • PUnc  (Not Specified)     Family History   Problem Relation Age of Onset   • Diabetes Mother    • Hypertension Mother    • Hyperlipidemia Mother    • Stroke Mother    • Heart Disease Mother 60   • Hypertension Father    • Cancer Paternal Uncle         several uncles iwth stomach cancer of some kind       ROS:  Review of Systems   Constitutional: Positive for fatigue, malaise. Negative for fever, chills, weight loss.  HENT: Negative for rhinitis, sore throat. Negative for ear pain, nosebleeds, and neck pain.    Eyes: Negative for vision changes.   Neuro: Positive for headache.   Cardiovascular: Negative for chest pain, palpitations, orthopnea and leg swelling.   Respiratory: Negative for cough, shortness of breath and wheezing.   Gastrointestinal: Positive for nausea. Negative for abdominal pain, vomiting or diarrhea.   Skin: Negative for rash, diaphoresis.     Exam:  /66 (BP Location: Left arm, Patient Position: Sitting, BP Cuff Size: Adult)   Pulse (!) 102   Temp 37.1 °C (98.8 °F) (Temporal)   Resp 16   Ht 1.6 m (5' 3\")   Wt 76 kg (167 lb 9.6 oz)   SpO2 97%   General: well-nourished, well-developed female in NAD  Head: normocephalic, atraumatic  Eyes: PERRLA, EOM within normal limits, no conjunctival injection, no scleral icterus, visual fields and acuity grossly intact.  Ears: normal shape and symmetry, no tenderness, no discharge. External canals are without any significant edema or erythema. Tympanic membranes are without any inflammation, no effusion. Gross auditory acuity is intact.  Nose: symmetrical without " tenderness, no discharge. No sinus tenderness.   Mouth/Throat: reasonable hygiene, no exudates or tonsillar enlargement. No erythema present.   Neck: no masses, range of motion within normal limits, no tracheal deviation.  Lymph: no cervical adenopathy. No supraclavicular adenopathy.   Neuro: alert and oriented. Cranial nerves 1-12 grossly intact.   Cardiovascular: regular rate auscultated, regular rhythm without murmurs, rubs, or gallops. No edema.   Pulmonary: no distress. Chest is symmetrical with respiration. No wheezes, crackles, or rhonchi.   Musculoskeletal: appropriate muscle tone, gait is stable.  Skin: warm, dry, intact, no clubbing, no cyanosis.   Psych: appropriate mood, affect, judgement.         Assessment/Plan:  1. Suspected COVID-19 virus infection  COVID/SARS CoV-2 PCR           Discussed symptoms most likely viral, will test for COVID. Home quarantine advised. Discussed natural progression and sx care.  Rest, increase fluids, hand and respiratory hygiene. May take OTC medications as directed for symptom relief. STRICT ER precautions.   Supportive care, differential diagnoses, and indications for immediate follow-up discussed with patient.   Pathogenesis of diagnosis discussed including typical length and natural progression.  Instructed to return to clinic or nearest emergency department for any change in condition, further concerns, or worsening of symptoms.  Patient states understanding of the plan of care and discharge instructions.          ROCÍO Oneill.

## 2021-09-04 DIAGNOSIS — Z20.822 SUSPECTED COVID-19 VIRUS INFECTION: ICD-10-CM

## 2021-09-04 LAB — COVID ORDER STATUS COVID19: NORMAL

## 2021-09-05 LAB
SARS-COV-2 RNA RESP QL NAA+PROBE: NOTDETECTED
SPECIMEN SOURCE: NORMAL

## 2022-04-12 ENCOUNTER — HOSPITAL ENCOUNTER (OUTPATIENT)
Dept: RADIOLOGY | Facility: MEDICAL CENTER | Age: 52
End: 2022-04-12
Attending: CLINICAL NURSE SPECIALIST
Payer: COMMERCIAL

## 2022-04-12 ENCOUNTER — OFFICE VISIT (OUTPATIENT)
Dept: MEDICAL GROUP | Facility: IMAGING CENTER | Age: 52
End: 2022-04-12
Payer: COMMERCIAL

## 2022-04-12 VITALS
HEIGHT: 64 IN | OXYGEN SATURATION: 95 % | HEART RATE: 73 BPM | WEIGHT: 168 LBS | BODY MASS INDEX: 28.68 KG/M2 | TEMPERATURE: 98.6 F | DIASTOLIC BLOOD PRESSURE: 62 MMHG | SYSTOLIC BLOOD PRESSURE: 102 MMHG

## 2022-04-12 DIAGNOSIS — F32.A ANXIETY AND DEPRESSION: ICD-10-CM

## 2022-04-12 DIAGNOSIS — R17 SCLERAL ICTERUS: ICD-10-CM

## 2022-04-12 DIAGNOSIS — M85.841 OSTEOPENIA OF BOTH HANDS: ICD-10-CM

## 2022-04-12 DIAGNOSIS — M85.842 OSTEOPENIA OF BOTH HANDS: ICD-10-CM

## 2022-04-12 DIAGNOSIS — M79.641 PAIN IN BOTH HANDS: ICD-10-CM

## 2022-04-12 DIAGNOSIS — Z13.31 DEPRESSION SCREENING: ICD-10-CM

## 2022-04-12 DIAGNOSIS — R14.0 BLOATED ABDOMEN: ICD-10-CM

## 2022-04-12 DIAGNOSIS — Z11.59 NEED FOR HEPATITIS C SCREENING TEST: ICD-10-CM

## 2022-04-12 DIAGNOSIS — M79.642 PAIN IN BOTH HANDS: ICD-10-CM

## 2022-04-12 DIAGNOSIS — G44.89 OTHER HEADACHE SYNDROME: ICD-10-CM

## 2022-04-12 DIAGNOSIS — M85.842 OSTEOPENIA OF HAND, BILATERAL: ICD-10-CM

## 2022-04-12 DIAGNOSIS — M85.841 OSTEOPENIA OF HAND, BILATERAL: ICD-10-CM

## 2022-04-12 DIAGNOSIS — H57.12 ACUTE LEFT EYE PAIN: ICD-10-CM

## 2022-04-12 DIAGNOSIS — F41.9 ANXIETY AND DEPRESSION: ICD-10-CM

## 2022-04-12 PROCEDURE — 77077 JOINT SURVEY SINGLE VIEW: CPT

## 2022-04-12 PROCEDURE — 99214 OFFICE O/P EST MOD 30 MIN: CPT | Performed by: CLINICAL NURSE SPECIALIST

## 2022-04-12 ASSESSMENT — ANXIETY QUESTIONNAIRES
4. TROUBLE RELAXING: MORE THAN HALF THE DAYS
3. WORRYING TOO MUCH ABOUT DIFFERENT THINGS: MORE THAN HALF THE DAYS
7. FEELING AFRAID AS IF SOMETHING AWFUL MIGHT HAPPEN: SEVERAL DAYS
GAD7 TOTAL SCORE: 10
2. NOT BEING ABLE TO STOP OR CONTROL WORRYING: MORE THAN HALF THE DAYS
5. BEING SO RESTLESS THAT IT IS HARD TO SIT STILL: NOT AT ALL
1. FEELING NERVOUS, ANXIOUS, OR ON EDGE: MORE THAN HALF THE DAYS
6. BECOMING EASILY ANNOYED OR IRRITABLE: SEVERAL DAYS

## 2022-04-12 ASSESSMENT — PATIENT HEALTH QUESTIONNAIRE - PHQ9
SUM OF ALL RESPONSES TO PHQ QUESTIONS 1-9: 15
5. POOR APPETITE OR OVEREATING: 2 - MORE THAN HALF THE DAYS
CLINICAL INTERPRETATION OF PHQ2 SCORE: 3

## 2022-04-12 ASSESSMENT — PAIN SCALES - GENERAL: PAINLEVEL: 7=MODERATE-SEVERE PAIN

## 2022-04-12 ASSESSMENT — FIBROSIS 4 INDEX: FIB4 SCORE: 0.79

## 2022-04-12 NOTE — ASSESSMENT & PLAN NOTE
She denies thoughts of suicide but reports sometimes she questions if anybody would miss her if she . She has never spoken with anyone about these feelings. She feels alone.  She has panic attacks.

## 2022-04-12 NOTE — PROGRESS NOTES
"Subjective     Gely Cottrell is a 51 y.o. female who presents with Facial Injury (Face swelling, eye swelling, started 2 weeks ago), Hand Pain (Both hand), and Bloated (Associated to foods, unable to narrow down. fatigue)            HPI  Eye swelling on left side for 2 weeks.  She has taken allergy medication but it made her sleepy.    Pain in both hands  Pain in both hands for one year, worse in AM and worse lately.  Pain in multiple joints bilaterally, worse on right.  She has been having trigger finger in her 4th finger.  She takes \"pain killers\" which only help some.      Bloated abdomen  Recently feeling bloated.      Anxiety and depression  She denies thoughts of suicide but reports sometimes she questions if anybody would miss her if she . She has never spoken with anyone about these feelings. She feels alone.  She has panic attacks.     Other headache syndrome  Occipital headache worse on left.  Relaxes when she takes a shower and ice helps.  Not getting worse. She sees double chronically but corrective lenses help.       ROS  See HPI     Allergies   Allergen Reactions   • Seasonal      Current Outpatient Medications on File Prior to Visit   Medication Sig Dispense Refill   • EPINEPHrine (EPIPEN) 0.3 MG/0.3ML Solution Auto-injector solution for injection      • Budesonide, Inhalation, 1 MG/2ML Suspension USE 1 VIAL VIA NEBILIZER EVERY 12 HOURS     • pantoprazole (PROTONIX) 40 MG Tablet Delayed Response Take 1 tablet by mouth every day. 45 tablet 0   • albuterol 108 (90 Base) MCG/ACT Aero Soln inhalation aerosol Inhale 2 Puffs every four hours as needed for Shortness of Breath. 1 Each 1     No current facility-administered medications on file prior to visit.     Depression Screen (PHQ-2/PHQ-9) 2021   PHQ-2 Total Score 0 3   PHQ-9 Total Score - 15       Interpretation of PHQ-9 Total Score   Score Severity   1-4 No Depression   5-9 Mild Depression   10-14 Moderate Depression   15-19 " "Moderately Severe Depression   20-27 Severe Depression    BELL-7 Questionnaire    Feeling nervous, anxious, or on edge: More than half the days  Not being able to sop or control worrying: More than half the days  Worrying too much about different things: More than half the days  Trouble relaxing: More than half the days  Being so restless that it's hard to sit still: Not at all  Becoming easily annoyed or irritable: Several days  Feeling afraid as if something awful might happen: Several days  Total: 10    Interpretation of BELL 7 Total Score   Score Severity :  0-4 No Anxiety   5-9 Mild Anxiety  10-14 Moderate Anxiety  15-21 Severe Anxiety        Objective     /62 (BP Location: Left arm, Patient Position: Sitting, BP Cuff Size: Adult)   Pulse 73   Temp 37 °C (98.6 °F) (Temporal)   Ht 1.626 m (5' 4\")   Wt 76.2 kg (168 lb)   SpO2 95%   BMI 28.84 kg/m²      Physical Exam  Constitutional:       General: She is not in acute distress.     Appearance: Normal appearance. She is not ill-appearing, toxic-appearing or diaphoretic.   HENT:      Head: Normocephalic and atraumatic.   Eyes:      General: Scleral icterus (mild) present.      Pupils: Pupils are equal, round, and reactive to light.      Comments: Left eye injected with bilateral mild scleral yellowing   Cardiovascular:      Rate and Rhythm: Normal rate.   Pulmonary:      Effort: Pulmonary effort is normal.   Abdominal:      General: There is no distension.      Palpations: There is no hepatomegaly, splenomegaly or mass.      Tenderness: There is abdominal tenderness. There is guarding (mild). There is no rebound. Positive signs include Akhtar's sign. Negative signs include Rovsing's sign and McBurney's sign.      Hernia: No hernia is present.   Skin:     General: Skin is warm and dry.   Neurological:      Mental Status: She is alert and oriented to person, place, and time.      Gait: Gait normal.   Psychiatric:         Mood and Affect: Mood normal.         " Behavior: Behavior normal.         Thought Content: Thought content normal.         Judgment: Judgment normal.                   Assessment & Plan        1. Pain in both hands  Bilateral joint pain in multiple joints, chronic but worsening with trigger finger in 4th finger.  Tenderness to palpation and edema bilaterally.    - CBC WITH DIFFERENTIAL; Future  - RHEUMATOID ARTHRITIS PANEL; Future  - CCP  - MAGNESIUM; Future  - DX-JOINT SURVEY-HANDS SINGLE VIEW; Future    2. Scleral icterus  Noted on exam, potentially baseline but this is my first encounter with Gely.  - CBC WITH DIFFERENTIAL; Future  - Comp Metabolic Panel; Future    3. Bloated abdomen  No bloating today but pain with palpation of RUQ.      4. Anxiety and depression  Denies suicidal thoughts currently. Discussed ER precautions. She declines medication management but will take magnesium. Referral to behavioral health.  - Referral to Behavioral Health    5. Need for hepatitis C screening test    - HEP C VIRUS ANTIBODY; Future    6. Other headache syndrome  Get a massage and take magnesium and report back if this does not help.    7. Acute left eye pain  Irrigated with saline and burning/feeling of foreign object improved.  Monitor.    8. Depression screening  + for depression and anxiety.    Return in about 1 week (around 4/19/2022), or if symptoms worsen or fail to improve, for With test results.

## 2022-04-12 NOTE — ASSESSMENT & PLAN NOTE
Occipital headache worse on left.  Relaxes when she takes a shower and ice helps.  Not getting worse. She sees double chronically but corrective lenses help.

## 2022-04-12 NOTE — ASSESSMENT & PLAN NOTE
"Pain in both hands for one year, worse in AM and worse lately.  Pain in multiple joints bilaterally, worse on right.  She has been having trigger finger in her 4th finger.  She takes \"pain killers\" which only help some.    "

## 2022-04-13 ENCOUNTER — HOSPITAL ENCOUNTER (OUTPATIENT)
Dept: LAB | Facility: MEDICAL CENTER | Age: 52
End: 2022-04-13
Attending: CLINICAL NURSE SPECIALIST
Payer: COMMERCIAL

## 2022-04-13 DIAGNOSIS — R17 SCLERAL ICTERUS: ICD-10-CM

## 2022-04-13 DIAGNOSIS — M79.641 PAIN IN BOTH HANDS: ICD-10-CM

## 2022-04-13 DIAGNOSIS — Z11.59 NEED FOR HEPATITIS C SCREENING TEST: ICD-10-CM

## 2022-04-13 DIAGNOSIS — M79.642 PAIN IN BOTH HANDS: ICD-10-CM

## 2022-04-13 PROBLEM — M85.841: Status: ACTIVE | Noted: 2022-04-12

## 2022-04-13 PROBLEM — M85.842: Status: ACTIVE | Noted: 2022-04-12

## 2022-04-13 LAB
ALBUMIN SERPL BCP-MCNC: 4.6 G/DL (ref 3.2–4.9)
ALBUMIN/GLOB SERPL: 1.7 G/DL
ALP SERPL-CCNC: 93 U/L (ref 30–99)
ALT SERPL-CCNC: 38 U/L (ref 2–50)
ANION GAP SERPL CALC-SCNC: 11 MMOL/L (ref 7–16)
AST SERPL-CCNC: 27 U/L (ref 12–45)
BASOPHILS # BLD AUTO: 0.7 % (ref 0–1.8)
BASOPHILS # BLD: 0.05 K/UL (ref 0–0.12)
BILIRUB SERPL-MCNC: 0.5 MG/DL (ref 0.1–1.5)
BUN SERPL-MCNC: 12 MG/DL (ref 8–22)
CALCIUM SERPL-MCNC: 9.8 MG/DL (ref 8.5–10.5)
CHLORIDE SERPL-SCNC: 103 MMOL/L (ref 96–112)
CO2 SERPL-SCNC: 23 MMOL/L (ref 20–33)
CREAT SERPL-MCNC: 0.64 MG/DL (ref 0.5–1.4)
EOSINOPHIL # BLD AUTO: 0.33 K/UL (ref 0–0.51)
EOSINOPHIL NFR BLD: 4.4 % (ref 0–6.9)
ERYTHROCYTE [DISTWIDTH] IN BLOOD BY AUTOMATED COUNT: 46.4 FL (ref 35.9–50)
GFR SERPLBLD CREATININE-BSD FMLA CKD-EPI: 107 ML/MIN/1.73 M 2
GLOBULIN SER CALC-MCNC: 2.7 G/DL (ref 1.9–3.5)
GLUCOSE SERPL-MCNC: 91 MG/DL (ref 65–99)
HCT VFR BLD AUTO: 46.6 % (ref 37–47)
HCV AB SER QL: NORMAL
HGB BLD-MCNC: 15.4 G/DL (ref 12–16)
IMM GRANULOCYTES # BLD AUTO: 0.03 K/UL (ref 0–0.11)
IMM GRANULOCYTES NFR BLD AUTO: 0.4 % (ref 0–0.9)
LYMPHOCYTES # BLD AUTO: 3.12 K/UL (ref 1–4.8)
LYMPHOCYTES NFR BLD: 41.7 % (ref 22–41)
MAGNESIUM SERPL-MCNC: 2.1 MG/DL (ref 1.5–2.5)
MCH RBC QN AUTO: 30.9 PG (ref 27–33)
MCHC RBC AUTO-ENTMCNC: 33 G/DL (ref 33.6–35)
MCV RBC AUTO: 93.6 FL (ref 81.4–97.8)
MONOCYTES # BLD AUTO: 0.39 K/UL (ref 0–0.85)
MONOCYTES NFR BLD AUTO: 5.2 % (ref 0–13.4)
NEUTROPHILS # BLD AUTO: 3.57 K/UL (ref 2–7.15)
NEUTROPHILS NFR BLD: 47.6 % (ref 44–72)
NRBC # BLD AUTO: 0 K/UL
NRBC BLD-RTO: 0 /100 WBC
PLATELET # BLD AUTO: 344 K/UL (ref 164–446)
PMV BLD AUTO: 10 FL (ref 9–12.9)
POTASSIUM SERPL-SCNC: 4 MMOL/L (ref 3.6–5.5)
PROT SERPL-MCNC: 7.3 G/DL (ref 6–8.2)
RBC # BLD AUTO: 4.98 M/UL (ref 4.2–5.4)
SODIUM SERPL-SCNC: 137 MMOL/L (ref 135–145)
WBC # BLD AUTO: 7.5 K/UL (ref 4.8–10.8)

## 2022-04-13 PROCEDURE — 86431 RHEUMATOID FACTOR QUANT: CPT

## 2022-04-13 PROCEDURE — 86803 HEPATITIS C AB TEST: CPT

## 2022-04-13 PROCEDURE — 80053 COMPREHEN METABOLIC PANEL: CPT

## 2022-04-13 PROCEDURE — 85025 COMPLETE CBC W/AUTO DIFF WBC: CPT

## 2022-04-13 PROCEDURE — 36415 COLL VENOUS BLD VENIPUNCTURE: CPT

## 2022-04-13 PROCEDURE — 83516 IMMUNOASSAY NONANTIBODY: CPT

## 2022-04-13 PROCEDURE — 86200 CCP ANTIBODY: CPT

## 2022-04-13 PROCEDURE — 83735 ASSAY OF MAGNESIUM: CPT

## 2022-04-18 LAB
CARBAMYLATED PROTEIN ANTIBODY, IGG Q6043: 13 UNITS (ref 0–19)
CCP IGG SERPL-ACNC: 2 UNITS (ref 0–19)
RHEUMATOID FACT SER NEPH-ACNC: <10 IU/ML (ref 0–14)

## 2022-04-19 ENCOUNTER — HOSPITAL ENCOUNTER (OUTPATIENT)
Dept: RADIOLOGY | Facility: MEDICAL CENTER | Age: 52
End: 2022-04-19
Attending: CLINICAL NURSE SPECIALIST
Payer: COMMERCIAL

## 2022-04-19 DIAGNOSIS — M85.841 OSTEOPENIA OF BOTH HANDS: ICD-10-CM

## 2022-04-19 DIAGNOSIS — M85.842 OSTEOPENIA OF BOTH HANDS: ICD-10-CM

## 2022-04-19 PROCEDURE — 77080 DXA BONE DENSITY AXIAL: CPT

## 2022-04-20 ENCOUNTER — OFFICE VISIT (OUTPATIENT)
Dept: MEDICAL GROUP | Facility: IMAGING CENTER | Age: 52
End: 2022-04-20
Payer: COMMERCIAL

## 2022-04-20 VITALS
RESPIRATION RATE: 14 BRPM | HEART RATE: 71 BPM | SYSTOLIC BLOOD PRESSURE: 112 MMHG | DIASTOLIC BLOOD PRESSURE: 70 MMHG | OXYGEN SATURATION: 96 % | HEIGHT: 64 IN | BODY MASS INDEX: 28.85 KG/M2 | TEMPERATURE: 98.5 F | WEIGHT: 169 LBS

## 2022-04-20 DIAGNOSIS — E55.9 VITAMIN D DEFICIENCY: ICD-10-CM

## 2022-04-20 DIAGNOSIS — R10.11 RUQ PAIN: ICD-10-CM

## 2022-04-20 DIAGNOSIS — J98.01 BRONCHOSPASM: ICD-10-CM

## 2022-04-20 DIAGNOSIS — F41.9 ANXIETY AND DEPRESSION: ICD-10-CM

## 2022-04-20 DIAGNOSIS — F32.A ANXIETY AND DEPRESSION: ICD-10-CM

## 2022-04-20 PROCEDURE — 99214 OFFICE O/P EST MOD 30 MIN: CPT | Performed by: FAMILY MEDICINE

## 2022-04-20 RX ORDER — ESCITALOPRAM OXALATE 10 MG/1
10 TABLET ORAL DAILY
Qty: 90 TABLET | Refills: 0 | Status: SHIPPED | OUTPATIENT
Start: 2022-04-20 | End: 2023-11-21

## 2022-04-20 RX ORDER — ALBUTEROL SULFATE 90 UG/1
2 AEROSOL, METERED RESPIRATORY (INHALATION) EVERY 4 HOURS PRN
Qty: 1 EACH | Refills: 1 | Status: SHIPPED | OUTPATIENT
Start: 2022-04-20 | End: 2023-11-21

## 2022-04-20 RX ORDER — ERGOCALCIFEROL 1.25 MG/1
50000 CAPSULE ORAL
Qty: 12 CAPSULE | Refills: 0 | Status: SHIPPED | OUTPATIENT
Start: 2022-04-20 | End: 2022-06-27

## 2022-04-20 ASSESSMENT — PATIENT HEALTH QUESTIONNAIRE - PHQ9
SUM OF ALL RESPONSES TO PHQ QUESTIONS 1-9: 7
CLINICAL INTERPRETATION OF PHQ2 SCORE: 2
5. POOR APPETITE OR OVEREATING: 1 - SEVERAL DAYS

## 2022-04-20 ASSESSMENT — ANXIETY QUESTIONNAIRES
7. FEELING AFRAID AS IF SOMETHING AWFUL MIGHT HAPPEN: SEVERAL DAYS
3. WORRYING TOO MUCH ABOUT DIFFERENT THINGS: SEVERAL DAYS
4. TROUBLE RELAXING: SEVERAL DAYS
GAD7 TOTAL SCORE: 7
1. FEELING NERVOUS, ANXIOUS, OR ON EDGE: SEVERAL DAYS
5. BEING SO RESTLESS THAT IT IS HARD TO SIT STILL: SEVERAL DAYS
2. NOT BEING ABLE TO STOP OR CONTROL WORRYING: SEVERAL DAYS
6. BECOMING EASILY ANNOYED OR IRRITABLE: SEVERAL DAYS

## 2022-04-20 ASSESSMENT — FIBROSIS 4 INDEX: FIB4 SCORE: 0.64

## 2022-04-20 ASSESSMENT — PAIN SCALES - GENERAL: PAINLEVEL: 7=MODERATE-SEVERE PAIN

## 2022-04-20 NOTE — PROGRESS NOTES
Chief Complaint   Patient presents with   • Hand Pain     Joint pain   • Anxiety     HPI:   With a history of   Patient Active Problem List   Diagnosis   • GERD (gastroesophageal reflux disease)   • RUQ abdominal pain   • History of cholecystectomy   • Abdominal pain, acute   • Diffuse pain   • Hypocalcemia   • Hyperparathyroidism due to vitamin D deficiency (HCC)   • Cough   • Neuropathy   • IGT (impaired glucose tolerance)   • Transaminitis   • Hypokalemia   • Pre-diabetes   • Osteopenia of hand, bilateral   • Bloated abdomen   • Anxiety and depression   • Other headache syndrome     Follow-up on hand pain. Osteopenia noted on xray. dexa normal in spine and femur  Abdominal pain and bloating ruq with nausea. No vomiting. No fevers or chills. Associated with foods. Unsure which.   Itchy watery eyes  No SI or HI. Confirmed.   BELL 7 4/12/2022 4/20/2022   BELL-7 Total Score 10 7       Interpretation of BELL 7 Total Score   Score Severity:  0-4 No Anxiety   5-9 Mild Anxiety  10-14 Moderate Anxiety  15-21 Severe Anxiety  Depression Screening    Little interest or pleasure in doing things?  1 - several days   Feeling down, depressed , or hopeless? 1 - several days   Trouble falling or staying asleep, or sleeping too much?  1 - several days   Feeling tired or having little energy?  1 - several days   Poor appetite or overeating?  1 - several days   Feeling bad about yourself - or that you are a failure or have let yourself or your family down? 1 - several days   Trouble concentrating on things, such as reading the newspaper or watching television? 1 - several days   Moving or speaking so slowly that other people could have noticed.  Or the opposite - being so fidgety or restless that you have been moving around a lot more than usual?  0 - not at all   Thoughts that you would be better off dead, or of hurting yourself?  0 - not at all   Patient Health Questionnaire Score: 7       If depressive symptoms identified deferred to  "follow up visit unless specifically addressed in assesment and plan.    Interpretation of PHQ-9 Total Score   Score Severity   1-4 No Depression   5-9 Mild Depression   10-14 Moderate Depression   15-19 Moderately Severe Depression   20-27 Severe Depression        Allergies   Allergen Reactions   • Seasonal      Current Outpatient Medications   Medication Sig Dispense Refill   • Naproxen Sodium (ALEVE PO) Take  by mouth.     • Acetaminophen (TYLENOL PO) Take  by mouth.     • albuterol 108 (90 Base) MCG/ACT Aero Soln inhalation aerosol Inhale 2 Puffs every four hours as needed for Shortness of Breath. 1 Each 1   • vitamin D2, Ergocalciferol, (DRISDOL) 1.25 MG (35260 UT) Cap capsule Take 1 Capsule by mouth every 7 days for 90 days. 12 Capsule 0   • escitalopram (LEXAPRO) 10 MG Tab Take 1 Tablet by mouth every day. 90 Tablet 0   • EPINEPHrine (EPIPEN) 0.3 MG/0.3ML Solution Auto-injector solution for injection      • Budesonide, Inhalation, 1 MG/2ML Suspension USE 1 VIAL VIA NEBILIZER EVERY 12 HOURS     • pantoprazole (PROTONIX) 40 MG Tablet Delayed Response Take 1 tablet by mouth every day. 45 tablet 0     No current facility-administered medications for this visit.     Social History     Tobacco Use   • Smoking status: Light Tobacco Smoker   • Smokeless tobacco: Never Used   • Tobacco comment: very occasional   Vaping Use   • Vaping Use: Never used   Substance Use Topics   • Alcohol use: Yes     Comment: socially   • Drug use: No     Family History   Problem Relation Age of Onset   • Diabetes Mother    • Hypertension Mother    • Hyperlipidemia Mother    • Stroke Mother    • Heart Disease Mother 60   • Hypertension Father    • Cancer Paternal Uncle         several uncles iwth stomach cancer of some kind       /70   Pulse 71   Temp 36.9 °C (98.5 °F)   Resp 14   Ht 1.626 m (5' 4\")   Wt 76.7 kg (169 lb)   SpO2 96%  Body mass index is 29.01 kg/m².  Review of Systems   Constitutional: Negative for chills, fever " and malaise/fatigue.   HENT: Negative for hearing loss and tinnitus.    Eyes: Negative for double vision and pain.   Respiratory: Negative for cough, shortness of breath and wheezing.    Cardiovascular: Negative for chest pain, palpitations and leg swelling.   Gastrointestinal:w abdominal pain, no diarrhea, nausea and vomiting.   Genitourinary: Negative for dysuria and frequency.   Musculoskeletal: w joint pain and no myalgias.   Skin: Negative for itching and rash.   Neurological: Negative for dizziness and headaches.     Physical Exam  Constitutional:       General: He is not in acute distress.     Appearance: He is not diaphoretic.   HENT:      Head: Normocephalic and atraumatic.   Eyes:      General: No scleral icterus.        Right eye: No discharge.         Left eye: No discharge.      Conjunctiva/sclera: Conjunctivae normal.      Pupils: Pupils are equal, round, and reactive to light.   Neck:      Thyroid: No thyromegaly.   Pulmonary:      Effort: Pulmonary effort is normal. No respiratory distress.   Abdominal:      General: There is no distension.   Skin:     General: Skin is warm and dry.   Neurological:      Mental Status: He is alert.   Psychiatric:         Mood and Affect: Affect normal.         Judgment: Judgment normal.         All labs (last 1 month):   Hospital Outpatient Visit on 04/13/2022   Component Date Value Ref Range Status   • Hepatitis C Antibody 04/13/2022 Non-Reactive  Non-Reactive Final    Comment: Antibodies to HCV were not detected.  The Roche anti-HCV is a diagnostic test for the qualitative determination of  antibodies to the hepatitis C virus in human serum and plasma. The results  should be used and interpreted only in the context of the overall clinical  picture. A negative test result does not exclude the possibility of exposure  to hepatitis C virus.  Note: Assay performance characteristics have not been established in  populations of immunocompromised or immunosuppressed  patients.     • WBC 04/13/2022 7.5  4.8 - 10.8 K/uL Final   • RBC 04/13/2022 4.98  4.20 - 5.40 M/uL Final   • Hemoglobin 04/13/2022 15.4  12.0 - 16.0 g/dL Final   • Hematocrit 04/13/2022 46.6  37.0 - 47.0 % Final   • MCV 04/13/2022 93.6  81.4 - 97.8 fL Final   • MCH 04/13/2022 30.9  27.0 - 33.0 pg Final   • MCHC 04/13/2022 33.0 (A) 33.6 - 35.0 g/dL Final   • RDW 04/13/2022 46.4  35.9 - 50.0 fL Final   • Platelet Count 04/13/2022 344  164 - 446 K/uL Final   • MPV 04/13/2022 10.0  9.0 - 12.9 fL Final   • Neutrophils-Polys 04/13/2022 47.60  44.00 - 72.00 % Final   • Lymphocytes 04/13/2022 41.70 (A) 22.00 - 41.00 % Final   • Monocytes 04/13/2022 5.20  0.00 - 13.40 % Final   • Eosinophils 04/13/2022 4.40  0.00 - 6.90 % Final   • Basophils 04/13/2022 0.70  0.00 - 1.80 % Final   • Immature Granulocytes 04/13/2022 0.40  0.00 - 0.90 % Final   • Nucleated RBC 04/13/2022 0.00  /100 WBC Final   • Neutrophils (Absolute) 04/13/2022 3.57  2.00 - 7.15 K/uL Final    Includes immature neutrophils, if present.   • Lymphs (Absolute) 04/13/2022 3.12  1.00 - 4.80 K/uL Final   • Monos (Absolute) 04/13/2022 0.39  0.00 - 0.85 K/uL Final   • Eos (Absolute) 04/13/2022 0.33  0.00 - 0.51 K/uL Final   • Baso (Absolute) 04/13/2022 0.05  0.00 - 0.12 K/uL Final   • Immature Granulocytes (abs) 04/13/2022 0.03  0.00 - 0.11 K/uL Final   • NRBC (Absolute) 04/13/2022 0.00  K/uL Final   • Sodium 04/13/2022 137  135 - 145 mmol/L Final   • Potassium 04/13/2022 4.0  3.6 - 5.5 mmol/L Final   • Chloride 04/13/2022 103  96 - 112 mmol/L Final   • Co2 04/13/2022 23  20 - 33 mmol/L Final   • Anion Gap 04/13/2022 11.0  7.0 - 16.0 Final   • Glucose 04/13/2022 91  65 - 99 mg/dL Final   • Bun 04/13/2022 12  8 - 22 mg/dL Final   • Creatinine 04/13/2022 0.64  0.50 - 1.40 mg/dL Final   • Calcium 04/13/2022 9.8  8.5 - 10.5 mg/dL Final   • AST(SGOT) 04/13/2022 27  12 - 45 U/L Final   • ALT(SGPT) 04/13/2022 38  2 - 50 U/L Final   • Alkaline Phosphatase 04/13/2022 93  30 -  99 U/L Final   • Total Bilirubin 04/13/2022 0.5  0.1 - 1.5 mg/dL Final   • Albumin 04/13/2022 4.6  3.2 - 4.9 g/dL Final   • Total Protein 04/13/2022 7.3  6.0 - 8.2 g/dL Final   • Globulin 04/13/2022 2.7  1.9 - 3.5 g/dL Final   • A-G Ratio 04/13/2022 1.7  g/dL Final   • Ccp Antibodies 04/13/2022 2  0 - 19 Units Final    Comment: INTERPRETIVE INFORMATION: Cyclic Citrullinated Peptide Antibody,  IgG  19 Units or less ................... Negative  20-39 Units ........................ Weak Positive  40-59 Units ........................ Moderate Positive  60 Units or greater ................ Strong Positive  Anti-cyclic citrullinated peptide (anti-CCP), IgG antibodies are  present in about 69-83 percent of patients with rheumatoid  arthritis (RA) and have specificities of 93-95 percent. These  autoantibodies may be present in the preclinical phase of disease,  are associated with future RA development, and may predict  radiographic joint destruction. Patients with weak positive  results should be monitored and testing repeated.     • Rheumatoid Factor -Neph- 04/13/2022 <10  0 - 14 IU/mL Final   • Carbamylated Protein Antibody, IgG 04/13/2022 13  0 - 19 Units Final    Comment: INTERPRETIVE INFORMATION: Carbamylated Protein (CarP) Ab,  IgG  Anti-carbamylated protein (anti-CarP) IgG antibodies are present  in about 34-53 percent of patients with RA, have specificities of  greater than 90 percent and can occur in RA patients seronegative  for both rheumatoid factor and anti-CCP. These autoantibodies may  be present in the preclinical phase of disease, are associated  with future RA development, and may predict radiographic joint  destruction. Patients with weak positive results should be  monitored and testing repeated.  This test was developed and its performance characteristics  determined by Consumer Agent Portal (CAP). It has not been cleared or  approved by the [place] Food and Drug Administration. This test was  performed in a [place]  certified laboratory and is intended for  clinical purposes.  Performed By: [facility]  [address] [place] [address]  : Constanza Pardo MD     • Magnesium 04/13/2022 2.1  1.5 - 2.5 mg/dL Final   • GFR (CKD-EPI) 04/13/2022 107  >60 mL/min/1.73 m 2 Final    Comment: Effective 3/15/2022, estimated Glomerular Filtration Rate  is calculated using race neutral CKD-EPI 2021 equation  per NK[facility]-ASN recommendations.         Lipids:   Lab Results   Component Value Date/Time    CHOLSTRLTOT 204 (H) 07/20/2021 12:24 PM    TRIGLYCERIDE 260 (H) 07/20/2021 12:24 PM    HDL 38 (A) 07/20/2021 12:24 PM     (H) 07/20/2021 12:24 PM       Imaging: DS-BONE DENSITY STUDY (DEXA)    Result Date: 4/19/2022 4/19/2022 11:18 AM HISTORY/REASON FOR EXAM:  Osteopenia found in hands with concurrent chronic back pain, postmenopausal, adult bone fracture. TECHNIQUE/EXAM DESCRIPTION AND NUMBER OF VIEWS: Dual x-ray bone densitometry was performed from the L1 through L4 levels and from the proximal left femur utilizing the GE Prodigy unit. COMPARISON: None. FINDINGS: The lumbar spine has a mean bone mineral density of 1.110 g/cm2, with a T score of -0.6 and a Z score of -0.4. The proximal left femur has a mean bone mineral density of 0.985 g/cm2, with a T score of -0.2 and a Z score of 0.1.     According to the World Health Organization classification, bone mineral density of this patient is normal in the left femur and lumbar spine. 10-year Probability of Fracture: Major Osteoporotic     4.3% Hip     0.3% Population      [place] () Based on left femur neck BMD INTERPRETING LOCATION: [address] [place] [address], [address]    DX-JOINT SURVEY-HANDS SINGLE VIEW    Result Date: 4/12/2022 4/12/2022 10:24 AM HISTORY/REASON FOR EXAM: Polyarthralgia for years, recent worsening TECHNIQUE/EXAM DESCRIPTION AND NUMBER OF VIEWS:  Joint survey, single view of the hands. COMPARISON: None FINDINGS: There is periarticular  osteopenia. No suspicious soft tissue calcification.  No erosion is identified.  Joint spaces are preserved and there are no osteophytes.  No subluxation or fracture.     Periarticular osteopenia Otherwise negative survey of the hands      A/P  Pt will restart prescription vit d if serum vit d still low.   Start lexapro. Discussed risks and benefits.  Return in about 1 month (around 5/20/2022).    Problem List Items Addressed This Visit     Anxiety and depression    Relevant Medications    escitalopram (LEXAPRO) 10 MG Tab    Other Relevant Orders    Referral to Psychology      Other Visit Diagnoses     Bronchospasm        Relevant Medications    albuterol 108 (90 Base) MCG/ACT Aero Soln inhalation aerosol    RUQ pain        Relevant Orders    US-RUQ    Comp Metabolic Panel    Vitamin D deficiency        Relevant Medications    vitamin D2, Ergocalciferol, (DRISDOL) 1.25 MG (36294 UT) Cap capsule    Other Relevant Orders    VITAMIN D,25 HYDROXY          Portions of this note may be dictated using Dragon [employer] voice recognition software.  Variances in spelling and vocabulary are possible and unintentional.  Not all areas may be caught/corrected.  Please notify me if any discrepancies are noted or if the meaning of any statement is not correct/clear.

## 2022-05-03 ENCOUNTER — HOSPITAL ENCOUNTER (OUTPATIENT)
Dept: RADIOLOGY | Facility: MEDICAL CENTER | Age: 52
End: 2022-05-03
Attending: FAMILY MEDICINE
Payer: COMMERCIAL

## 2022-05-03 DIAGNOSIS — R10.11 RUQ PAIN: ICD-10-CM

## 2022-05-03 PROCEDURE — 76705 ECHO EXAM OF ABDOMEN: CPT

## 2022-06-23 DIAGNOSIS — E55.9 VITAMIN D DEFICIENCY: ICD-10-CM

## 2022-06-23 NOTE — TELEPHONE ENCOUNTER
Next appointment is set for 08/01/22    Received request via: Pharmacy    Was the patient seen in the last year in this department? Yes    Does the patient have an active prescription (recently filled or refills available) for medication(s) requested? No

## 2022-06-27 RX ORDER — ERGOCALCIFEROL 1.25 MG/1
CAPSULE ORAL
Qty: 12 CAPSULE | Refills: 0 | Status: SHIPPED | OUTPATIENT
Start: 2022-06-27 | End: 2023-11-21

## 2022-08-01 ENCOUNTER — APPOINTMENT (OUTPATIENT)
Dept: MEDICAL GROUP | Facility: IMAGING CENTER | Age: 52
End: 2022-08-01
Payer: COMMERCIAL

## 2022-09-29 ENCOUNTER — APPOINTMENT (OUTPATIENT)
Dept: RADIOLOGY | Facility: IMAGING CENTER | Age: 52
End: 2022-09-29
Attending: FAMILY MEDICINE
Payer: COMMERCIAL

## 2022-09-29 ENCOUNTER — OFFICE VISIT (OUTPATIENT)
Dept: URGENT CARE | Facility: PHYSICIAN GROUP | Age: 52
End: 2022-09-29
Payer: COMMERCIAL

## 2022-09-29 VITALS
HEIGHT: 64 IN | HEART RATE: 78 BPM | BODY MASS INDEX: 28.85 KG/M2 | WEIGHT: 169 LBS | TEMPERATURE: 98.3 F | RESPIRATION RATE: 12 BRPM | OXYGEN SATURATION: 98 % | DIASTOLIC BLOOD PRESSURE: 72 MMHG | SYSTOLIC BLOOD PRESSURE: 118 MMHG

## 2022-09-29 DIAGNOSIS — S59.911A INJURY OF RIGHT FOREARM, INITIAL ENCOUNTER: ICD-10-CM

## 2022-09-29 DIAGNOSIS — R25.2 SPASM: ICD-10-CM

## 2022-09-29 DIAGNOSIS — S56.912A STRAIN OF LEFT FOREARM, INITIAL ENCOUNTER: ICD-10-CM

## 2022-09-29 PROCEDURE — 73090 X-RAY EXAM OF FOREARM: CPT | Mod: TC,RT | Performed by: FAMILY MEDICINE

## 2022-09-29 PROCEDURE — 99213 OFFICE O/P EST LOW 20 MIN: CPT | Performed by: FAMILY MEDICINE

## 2022-09-29 RX ORDER — KETOROLAC TROMETHAMINE 30 MG/ML
30 INJECTION, SOLUTION INTRAMUSCULAR; INTRAVENOUS ONCE
Status: COMPLETED | OUTPATIENT
Start: 2022-09-29 | End: 2022-09-29

## 2022-09-29 RX ORDER — NAPROXEN 500 MG/1
500 TABLET ORAL 2 TIMES DAILY WITH MEALS
Qty: 14 TABLET | Refills: 0 | Status: SHIPPED | OUTPATIENT
Start: 2022-09-29 | End: 2022-10-06

## 2022-09-29 RX ORDER — CYCLOBENZAPRINE HCL 10 MG
10 TABLET ORAL 3 TIMES DAILY PRN
Qty: 20 TABLET | Refills: 0 | Status: SHIPPED | OUTPATIENT
Start: 2022-09-29 | End: 2023-11-21

## 2022-09-29 RX ADMIN — KETOROLAC TROMETHAMINE 30 MG: 30 INJECTION, SOLUTION INTRAMUSCULAR; INTRAVENOUS at 16:26

## 2022-09-29 ASSESSMENT — ENCOUNTER SYMPTOMS
NAUSEA: 0
EYE REDNESS: 0
MYALGIAS: 0
EYE DISCHARGE: 0
VOMITING: 0
WEIGHT LOSS: 0

## 2022-09-29 ASSESSMENT — FIBROSIS 4 INDEX: FIB4 SCORE: 0.65

## 2022-09-29 NOTE — LETTER
September 29, 2022         Patient: Gely Cottrell   YOB: 1970   Date of Visit: 9/29/2022           To Whom it May Concern:    Gely Cottrell was seen in my clinic on 9/29/2022. Please excuse from work 9/29 and 9/30/2022     Sincerely,           Don Mao M.D.  Electronically Signed
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
## 2023-02-27 ENCOUNTER — APPOINTMENT (OUTPATIENT)
Dept: RADIOLOGY | Facility: MEDICAL CENTER | Age: 53
End: 2023-02-27
Attending: EMERGENCY MEDICINE

## 2023-02-27 ENCOUNTER — HOSPITAL ENCOUNTER (EMERGENCY)
Facility: MEDICAL CENTER | Age: 53
End: 2023-02-27
Attending: EMERGENCY MEDICINE
Payer: COMMERCIAL

## 2023-02-27 VITALS
SYSTOLIC BLOOD PRESSURE: 129 MMHG | WEIGHT: 170 LBS | OXYGEN SATURATION: 96 % | HEART RATE: 93 BPM | HEIGHT: 64 IN | TEMPERATURE: 97.4 F | BODY MASS INDEX: 29.02 KG/M2 | RESPIRATION RATE: 18 BRPM | DIASTOLIC BLOOD PRESSURE: 90 MMHG

## 2023-02-27 DIAGNOSIS — M54.50 ACUTE BILATERAL LOW BACK PAIN WITHOUT SCIATICA: ICD-10-CM

## 2023-02-27 DIAGNOSIS — M25.511 ACUTE PAIN OF RIGHT SHOULDER: ICD-10-CM

## 2023-02-27 DIAGNOSIS — M65.341 TRIGGER RING FINGER OF RIGHT HAND: ICD-10-CM
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
## 2023-02-27 PROCEDURE — A9270 NON-COVERED ITEM OR SERVICE: HCPCS | Performed by: EMERGENCY MEDICINE

## 2023-02-27 PROCEDURE — 73030 X-RAY EXAM OF SHOULDER: CPT | Mod: RT

## 2023-02-27 PROCEDURE — 72100 X-RAY EXAM L-S SPINE 2/3 VWS: CPT

## 2023-02-27 PROCEDURE — 700102 HCHG RX REV CODE 250 W/ 637 OVERRIDE(OP): Performed by: EMERGENCY MEDICINE

## 2023-02-27 PROCEDURE — 99284 EMERGENCY DEPT VISIT MOD MDM: CPT

## 2023-02-27 RX ORDER — IBUPROFEN 600 MG/1
600 TABLET ORAL ONCE
Status: COMPLETED | OUTPATIENT
Start: 2023-02-27 | End: 2023-02-27

## 2023-02-27 RX ORDER — ACETAMINOPHEN 500 MG
1000 TABLET ORAL ONCE
Status: COMPLETED | OUTPATIENT
Start: 2023-02-27 | End: 2023-02-27

## 2023-02-27 RX ADMIN — IBUPROFEN 600 MG: 600 TABLET, FILM COATED ORAL at 17:22

## 2023-02-27 RX ADMIN — ACETAMINOPHEN 1000 MG: 500 TABLET, FILM COATED ORAL at 17:22

## 2023-02-27 ASSESSMENT — FIBROSIS 4 INDEX: FIB4 SCORE: 0.65
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
## 2023-02-28 NOTE — ED NOTES
Pt discharged home as ordered by erp. Pt instructed to follow up with her PCP and return here as needed. Pt verbalized understanding and left ambulating independently

## 2023-02-28 NOTE — DISCHARGE INSTRUCTIONS
You will be sore for several days to couple weeks, use Motrin Tylenol for pain.  The tingling sensation should resolve with time also.    You have a trigger finger on the right ring finger.  Please call the orthopedic number provided above to get follow-up.
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
## 2023-03-02 ENCOUNTER — OFFICE VISIT (OUTPATIENT)
Dept: URGENT CARE | Facility: PHYSICIAN GROUP | Age: 53
End: 2023-03-02
Payer: COMMERCIAL

## 2023-03-02 VITALS
TEMPERATURE: 98.7 F | OXYGEN SATURATION: 97 % | BODY MASS INDEX: 30.22 KG/M2 | HEART RATE: 72 BPM | DIASTOLIC BLOOD PRESSURE: 84 MMHG | WEIGHT: 177 LBS | SYSTOLIC BLOOD PRESSURE: 140 MMHG | HEIGHT: 64 IN | RESPIRATION RATE: 16 BRPM

## 2023-03-02 DIAGNOSIS — R11.0 NAUSEA: ICD-10-CM

## 2023-03-02 DIAGNOSIS — M62.838 CERVICAL PARASPINAL MUSCLE SPASM: ICD-10-CM

## 2023-03-02 DIAGNOSIS — S09.90XA INJURY OF HEAD, INITIAL ENCOUNTER: ICD-10-CM

## 2023-03-02 DIAGNOSIS — H92.02 ACUTE OTALGIA, LEFT: ICD-10-CM

## 2023-03-02 DIAGNOSIS — R42 LIGHTHEADEDNESS: ICD-10-CM
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
## 2023-03-02 PROCEDURE — 99213 OFFICE O/P EST LOW 20 MIN: CPT | Performed by: FAMILY MEDICINE

## 2023-03-02 RX ORDER — NAPROXEN 500 MG/1
500 TABLET ORAL 2 TIMES DAILY WITH MEALS
Qty: 20 TABLET | Refills: 0 | Status: SHIPPED | OUTPATIENT
Start: 2023-03-02 | End: 2023-03-12

## 2023-03-02 RX ORDER — ONDANSETRON 4 MG/1
4 TABLET, FILM COATED ORAL EVERY 4 HOURS PRN
Qty: 10 TABLET | Refills: 0 | Status: SHIPPED | OUTPATIENT
Start: 2023-03-02 | End: 2023-03-05

## 2023-03-02 ASSESSMENT — ENCOUNTER SYMPTOMS
MYALGIAS: 0
VOMITING: 0
ANOREXIA: 0
FEVER: 0
NUMBNESS: 0
DIZZINESS: 0
NAUSEA: 0
WEAKNESS: 0
SORE THROAT: 0
CHILLS: 0
SHORTNESS OF BREATH: 0
COUGH: 0

## 2023-03-02 ASSESSMENT — FIBROSIS 4 INDEX: FIB4 SCORE: 0.65

## 2023-03-02 NOTE — LETTER
March 2, 2023         Patient: Gely Cottrell   YOB: 1970   Date of Visit: 3/2/2023           To Whom it May Concern:    Gely Cottrell was seen in my clinic on 3/2/2023. She may return to work on 3/5/2023.    If you have any questions or concerns, please don't hesitate to call.        Sincerely,           Rashad Hernandez M.D.  Electronically Signed
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
## 2023-03-23 ENCOUNTER — TELEMEDICINE (OUTPATIENT)
Dept: MEDICAL GROUP | Facility: IMAGING CENTER | Age: 53
End: 2023-03-23
Payer: COMMERCIAL

## 2023-03-23 VITALS
HEART RATE: 72 BPM | HEIGHT: 64 IN | SYSTOLIC BLOOD PRESSURE: 140 MMHG | OXYGEN SATURATION: 97 % | DIASTOLIC BLOOD PRESSURE: 84 MMHG | WEIGHT: 177 LBS | RESPIRATION RATE: 16 BRPM | BODY MASS INDEX: 30.22 KG/M2 | TEMPERATURE: 98.7 F

## 2023-03-23 DIAGNOSIS — R53.83 MALAISE AND FATIGUE: ICD-10-CM

## 2023-03-23 DIAGNOSIS — Z12.11 COLON CANCER SCREENING: ICD-10-CM

## 2023-03-23 DIAGNOSIS — R10.13 EPIGASTRIC PAIN: ICD-10-CM

## 2023-03-23 DIAGNOSIS — Z91.89 AT RISK FOR INFECTIOUS DISEASE DUE TO RECENT FOREIGN TRAVEL: ICD-10-CM

## 2023-03-23 DIAGNOSIS — R11.2 NAUSEA AND VOMITING, UNSPECIFIED VOMITING TYPE: ICD-10-CM

## 2023-03-23 DIAGNOSIS — R07.89 FEELING OF CHEST TIGHTNESS: ICD-10-CM

## 2023-03-23 DIAGNOSIS — R69 ILLNESS ASSOCIATED WITH TRAVEL: ICD-10-CM

## 2023-03-23 DIAGNOSIS — R53.81 MALAISE AND FATIGUE: ICD-10-CM

## 2023-03-23 PROCEDURE — 99214 OFFICE O/P EST MOD 30 MIN: CPT | Mod: 95

## 2023-03-23 ASSESSMENT — ENCOUNTER SYMPTOMS
PALPITATIONS: 0
SINUS PAIN: 0
CHILLS: 0
MYALGIAS: 1
SENSORY CHANGE: 0
FOCAL WEAKNESS: 0
HEADACHES: 1
VOMITING: 1
HEARTBURN: 0
RESPIRATORY NEGATIVE: 1
BLOOD IN STOOL: 0
BACK PAIN: 0
EYES NEGATIVE: 1
FALLS: 0
ORTHOPNEA: 0
ABDOMINAL PAIN: 1
SPEECH CHANGE: 0
NAUSEA: 1
WEAKNESS: 1
FEVER: 0
LOSS OF CONSCIOUSNESS: 0
PND: 0
DIZZINESS: 1
DIAPHORESIS: 0
NECK PAIN: 0
SEIZURES: 0
WEIGHT LOSS: 0
SORE THROAT: 0
PSYCHIATRIC NEGATIVE: 1
CLAUDICATION: 0
STRIDOR: 0
CONSTIPATION: 0
TREMORS: 0
DIARRHEA: 0
TINGLING: 0

## 2023-03-23 ASSESSMENT — FIBROSIS 4 INDEX: FIB4 SCORE: 0.65

## 2023-03-23 ASSESSMENT — PATIENT HEALTH QUESTIONNAIRE - PHQ9: CLINICAL INTERPRETATION OF PHQ2 SCORE: 0

## 2023-03-24 ENCOUNTER — HOSPITAL ENCOUNTER (OUTPATIENT)
Dept: LAB | Facility: MEDICAL CENTER | Age: 53
End: 2023-03-24
Payer: COMMERCIAL

## 2023-03-24 DIAGNOSIS — R10.13 EPIGASTRIC PAIN: ICD-10-CM

## 2023-03-24 DIAGNOSIS — E55.9 VITAMIN D DEFICIENCY: ICD-10-CM

## 2023-03-24 DIAGNOSIS — R07.89 FEELING OF CHEST TIGHTNESS: ICD-10-CM

## 2023-03-24 DIAGNOSIS — R53.81 MALAISE AND FATIGUE: ICD-10-CM

## 2023-03-24 DIAGNOSIS — Z91.89 AT RISK FOR INFECTIOUS DISEASE DUE TO RECENT FOREIGN TRAVEL: ICD-10-CM

## 2023-03-24 DIAGNOSIS — R53.83 MALAISE AND FATIGUE: ICD-10-CM

## 2023-03-24 DIAGNOSIS — R11.2 NAUSEA AND VOMITING, UNSPECIFIED VOMITING TYPE: ICD-10-CM

## 2023-03-24 DIAGNOSIS — R69 ILLNESS ASSOCIATED WITH TRAVEL: ICD-10-CM

## 2023-03-24 DIAGNOSIS — R10.11 RUQ PAIN: ICD-10-CM

## 2023-03-24 LAB
25(OH)D3 SERPL-MCNC: 23 NG/ML (ref 30–100)
25(OH)D3 SERPL-MCNC: 23 NG/ML (ref 30–100)
ALBUMIN SERPL BCP-MCNC: 3.8 G/DL (ref 3.2–4.9)
ALBUMIN SERPL BCP-MCNC: 3.9 G/DL (ref 3.2–4.9)
ALBUMIN/GLOB SERPL: 1.2 G/DL
ALBUMIN/GLOB SERPL: 1.3 G/DL
ALP SERPL-CCNC: 105 U/L (ref 30–99)
ALP SERPL-CCNC: 106 U/L (ref 30–99)
ALT SERPL-CCNC: 71 U/L (ref 2–50)
ALT SERPL-CCNC: 73 U/L (ref 2–50)
AMYLASE SERPL-CCNC: 88 U/L (ref 20–103)
ANION GAP SERPL CALC-SCNC: 10 MMOL/L (ref 7–16)
ANION GAP SERPL CALC-SCNC: 10 MMOL/L (ref 7–16)
APPEARANCE UR: CLEAR
AST SERPL-CCNC: 34 U/L (ref 12–45)
AST SERPL-CCNC: 38 U/L (ref 12–45)
BASOPHILS # BLD AUTO: 0.5 % (ref 0–1.8)
BASOPHILS # BLD: 0.06 K/UL (ref 0–0.12)
BILIRUB SERPL-MCNC: 0.4 MG/DL (ref 0.1–1.5)
BILIRUB SERPL-MCNC: 0.4 MG/DL (ref 0.1–1.5)
BILIRUB UR QL STRIP.AUTO: NEGATIVE
BUN SERPL-MCNC: 13 MG/DL (ref 8–22)
BUN SERPL-MCNC: 13 MG/DL (ref 8–22)
CALCIUM ALBUM COR SERPL-MCNC: 9.4 MG/DL (ref 8.5–10.5)
CALCIUM ALBUM COR SERPL-MCNC: 9.5 MG/DL (ref 8.5–10.5)
CALCIUM SERPL-MCNC: 9.3 MG/DL (ref 8.5–10.5)
CALCIUM SERPL-MCNC: 9.3 MG/DL (ref 8.5–10.5)
CHLORIDE SERPL-SCNC: 107 MMOL/L (ref 96–112)
CHLORIDE SERPL-SCNC: 107 MMOL/L (ref 96–112)
CHOLEST SERPL-MCNC: 182 MG/DL (ref 100–199)
CO2 SERPL-SCNC: 23 MMOL/L (ref 20–33)
CO2 SERPL-SCNC: 23 MMOL/L (ref 20–33)
COLOR UR: YELLOW
CREAT SERPL-MCNC: 0.63 MG/DL (ref 0.5–1.4)
CREAT SERPL-MCNC: 0.65 MG/DL (ref 0.5–1.4)
CRP SERPL HS-MCNC: 1.5 MG/L (ref 0–3)
EOSINOPHIL # BLD AUTO: 0.14 K/UL (ref 0–0.51)
EOSINOPHIL NFR BLD: 1.2 % (ref 0–6.9)
ERYTHROCYTE [DISTWIDTH] IN BLOOD BY AUTOMATED COUNT: 47.8 FL (ref 35.9–50)
ERYTHROCYTE [SEDIMENTATION RATE] IN BLOOD BY WESTERGREN METHOD: 5 MM/HOUR (ref 0–25)
EST. AVERAGE GLUCOSE BLD GHB EST-MCNC: 126 MG/DL
GFR SERPLBLD CREATININE-BSD FMLA CKD-EPI: 106 ML/MIN/1.73 M 2
GFR SERPLBLD CREATININE-BSD FMLA CKD-EPI: 107 ML/MIN/1.73 M 2
GGT SERPL-CCNC: 33 U/L (ref 7–34)
GLOBULIN SER CALC-MCNC: 3 G/DL (ref 1.9–3.5)
GLOBULIN SER CALC-MCNC: 3.1 G/DL (ref 1.9–3.5)
GLUCOSE SERPL-MCNC: 89 MG/DL (ref 65–99)
GLUCOSE SERPL-MCNC: 89 MG/DL (ref 65–99)
GLUCOSE UR STRIP.AUTO-MCNC: NEGATIVE MG/DL
HAV IGM SERPL QL IA: NORMAL
HBA1C MFR BLD: 6 % (ref 4–5.6)
HBV CORE IGM SER QL: NORMAL
HBV SURFACE AG SER QL: NORMAL
HCT VFR BLD AUTO: 45.9 % (ref 37–47)
HCV AB SER QL: NORMAL
HDLC SERPL-MCNC: 56 MG/DL
HGB BLD-MCNC: 15.4 G/DL (ref 12–16)
IMM GRANULOCYTES # BLD AUTO: 0.08 K/UL (ref 0–0.11)
IMM GRANULOCYTES NFR BLD AUTO: 0.7 % (ref 0–0.9)
KETONES UR STRIP.AUTO-MCNC: NEGATIVE MG/DL
LDLC SERPL CALC-MCNC: 100 MG/DL
LEUKOCYTE ESTERASE UR QL STRIP.AUTO: NEGATIVE
LIPASE SERPL-CCNC: 38 U/L (ref 11–82)
LYMPHOCYTES # BLD AUTO: 3.38 K/UL (ref 1–4.8)
LYMPHOCYTES NFR BLD: 28.4 % (ref 22–41)
MCH RBC QN AUTO: 31.2 PG (ref 27–33)
MCHC RBC AUTO-ENTMCNC: 33.6 G/DL (ref 33.6–35)
MCV RBC AUTO: 92.9 FL (ref 81.4–97.8)
MICRO URNS: NORMAL
MONOCYTES # BLD AUTO: 0.61 K/UL (ref 0–0.85)
MONOCYTES NFR BLD AUTO: 5.1 % (ref 0–13.4)
NEUTROPHILS # BLD AUTO: 7.63 K/UL (ref 2–7.15)
NEUTROPHILS NFR BLD: 64.1 % (ref 44–72)
NITRITE UR QL STRIP.AUTO: NEGATIVE
NRBC # BLD AUTO: 0 K/UL
NRBC BLD-RTO: 0 /100 WBC
PH UR STRIP.AUTO: 6.5 [PH] (ref 5–8)
PLATELET # BLD AUTO: 326 K/UL (ref 164–446)
PMV BLD AUTO: 9.3 FL (ref 9–12.9)
POTASSIUM SERPL-SCNC: 4.3 MMOL/L (ref 3.6–5.5)
POTASSIUM SERPL-SCNC: 4.3 MMOL/L (ref 3.6–5.5)
PROT SERPL-MCNC: 6.9 G/DL (ref 6–8.2)
PROT SERPL-MCNC: 6.9 G/DL (ref 6–8.2)
PROT UR QL STRIP: NEGATIVE MG/DL
RBC # BLD AUTO: 4.94 M/UL (ref 4.2–5.4)
RBC UR QL AUTO: NEGATIVE
SODIUM SERPL-SCNC: 140 MMOL/L (ref 135–145)
SODIUM SERPL-SCNC: 140 MMOL/L (ref 135–145)
SP GR UR STRIP.AUTO: 1.02
TRIGL SERPL-MCNC: 129 MG/DL (ref 0–149)
TSH SERPL DL<=0.005 MIU/L-ACNC: 2.46 UIU/ML (ref 0.38–5.33)
UROBILINOGEN UR STRIP.AUTO-MCNC: 0.2 MG/DL
WBC # BLD AUTO: 11.9 K/UL (ref 4.8–10.8)

## 2023-03-24 PROCEDURE — 81003 URINALYSIS AUTO W/O SCOPE: CPT

## 2023-03-24 PROCEDURE — 84443 ASSAY THYROID STIM HORMONE: CPT

## 2023-03-24 PROCEDURE — 80074 ACUTE HEPATITIS PANEL: CPT

## 2023-03-24 PROCEDURE — 86141 C-REACTIVE PROTEIN HS: CPT

## 2023-03-24 PROCEDURE — 80061 LIPID PANEL: CPT

## 2023-03-24 PROCEDURE — 83690 ASSAY OF LIPASE: CPT

## 2023-03-24 PROCEDURE — 82150 ASSAY OF AMYLASE: CPT

## 2023-03-24 PROCEDURE — 36415 COLL VENOUS BLD VENIPUNCTURE: CPT

## 2023-03-24 PROCEDURE — 85652 RBC SED RATE AUTOMATED: CPT

## 2023-03-24 PROCEDURE — 82977 ASSAY OF GGT: CPT

## 2023-03-24 PROCEDURE — 82306 VITAMIN D 25 HYDROXY: CPT | Mod: 91

## 2023-03-24 PROCEDURE — 85025 COMPLETE CBC W/AUTO DIFF WBC: CPT

## 2023-03-24 PROCEDURE — 83036 HEMOGLOBIN GLYCOSYLATED A1C: CPT

## 2023-03-24 PROCEDURE — 80053 COMPREHEN METABOLIC PANEL: CPT | Mod: 91

## 2023-03-24 PROCEDURE — 87798 DETECT AGENT NOS DNA AMP: CPT | Mod: 91

## 2023-03-27 ENCOUNTER — HOSPITAL ENCOUNTER (OUTPATIENT)
Facility: MEDICAL CENTER | Age: 53
End: 2023-03-27
Payer: COMMERCIAL

## 2023-03-27 DIAGNOSIS — R69 ILLNESS ASSOCIATED WITH TRAVEL: ICD-10-CM

## 2023-03-27 DIAGNOSIS — R53.81 MALAISE AND FATIGUE: ICD-10-CM

## 2023-03-27 DIAGNOSIS — R53.83 MALAISE AND FATIGUE: ICD-10-CM

## 2023-03-27 DIAGNOSIS — R10.13 EPIGASTRIC PAIN: ICD-10-CM

## 2023-03-27 DIAGNOSIS — Z91.89 AT RISK FOR INFECTIOUS DISEASE DUE TO RECENT FOREIGN TRAVEL: ICD-10-CM

## 2023-03-27 LAB — H PYLORI AG STL QL IA: DETECTED

## 2023-03-27 PROCEDURE — 87209 SMEAR COMPLEX STAIN: CPT

## 2023-03-27 PROCEDURE — 87177 OVA AND PARASITES SMEARS: CPT

## 2023-03-27 PROCEDURE — 87338 HPYLORI STOOL AG IA: CPT

## 2023-03-28 ENCOUNTER — OFFICE VISIT (OUTPATIENT)
Dept: MEDICAL GROUP | Facility: IMAGING CENTER | Age: 53
End: 2023-03-28
Payer: COMMERCIAL

## 2023-03-28 VITALS
DIASTOLIC BLOOD PRESSURE: 78 MMHG | HEART RATE: 69 BPM | TEMPERATURE: 97.4 F | OXYGEN SATURATION: 95 % | WEIGHT: 172 LBS | HEIGHT: 64 IN | BODY MASS INDEX: 29.37 KG/M2 | SYSTOLIC BLOOD PRESSURE: 122 MMHG | RESPIRATION RATE: 16 BRPM

## 2023-03-28 DIAGNOSIS — R73.03 PRE-DIABETES: Chronic | ICD-10-CM

## 2023-03-28 DIAGNOSIS — R74.01 ELEVATED ALT MEASUREMENT: ICD-10-CM

## 2023-03-28 DIAGNOSIS — A04.8 H. PYLORI INFECTION: ICD-10-CM

## 2023-03-28 DIAGNOSIS — R10.13 EPIGASTRIC ABDOMINAL PAIN: ICD-10-CM

## 2023-03-28 DIAGNOSIS — E55.9 VITAMIN D INSUFFICIENCY: ICD-10-CM

## 2023-03-28 DIAGNOSIS — D72.9 NEUTROPHILIC LEUKOCYTOSIS: ICD-10-CM

## 2023-03-28 LAB
P FALCIPARUM DNA BLD QL NAA+PROBE: NOT DETECTED
P MALARIAE DNA BLD QL NAA+PROBE: NOT DETECTED
P OVALE DNA BLD QL NAA+PROBE: NOT DETECTED
P VIVAX DNA BLD QL NAA+PROBE: NOT DETECTED
PLASMODIUM DNA BLD QL NAA+PROBE: NOT DETECTED

## 2023-03-28 PROCEDURE — 99214 OFFICE O/P EST MOD 30 MIN: CPT

## 2023-03-28 RX ORDER — AMOXICILLIN 500 MG/1
1000 CAPSULE ORAL 2 TIMES DAILY
Qty: 28 CAPSULE | Refills: 0 | Status: SHIPPED | OUTPATIENT
Start: 2023-03-28 | End: 2023-04-11

## 2023-03-28 RX ORDER — CHOLECALCIFEROL (VITAMIN D3) 1250 MCG
1 CAPSULE ORAL
Qty: 12 CAPSULE | Refills: 1 | Status: SHIPPED | OUTPATIENT
Start: 2023-03-28 | End: 2023-11-21

## 2023-03-28 RX ORDER — PANTOPRAZOLE SODIUM 40 MG/1
40 TABLET, DELAYED RELEASE ORAL DAILY
Qty: 30 TABLET | Refills: 3 | Status: SHIPPED | OUTPATIENT
Start: 2023-03-28 | End: 2023-04-27

## 2023-03-28 RX ORDER — METRONIDAZOLE 500 MG/1
500 TABLET ORAL 2 TIMES DAILY
Qty: 28 TABLET | Refills: 0 | Status: SHIPPED | OUTPATIENT
Start: 2023-03-28 | End: 2023-04-11

## 2023-03-28 ASSESSMENT — FIBROSIS 4 INDEX: FIB4 SCORE: 0.7

## 2023-03-28 NOTE — PROGRESS NOTES
"Subjective:     Chief Complaint   Patient presents with    Lab Results    Otalgia     Ringing pass a month right ear       HPI:   Gely presents today to discuss lab results:    Tinnitus, right  Established condition. Symptom would wax and wane. Patient reports of seeing a specialist in [facility] who diagnosed her with \" soft tissue detachment in her inner ear.\"  She has tried steroid treatment for this without much relief.    Patient symptoms flared up after coming back from [facility] a last week.   Denies vertigo or hearing loss.    Epigastric pain  Patient continues to have epigastric pain; however, she is overall feeling better compared to last office encounter. Patient's appetite has improved and able to tolerate oral intake without nausea and vomiting.    Latest Reference Range & Units 03/27/23 14:02   H Pylori Ag Stool E Not Detected  DETECTED !     Vitamin D insufficiency  Established condition.  Patient was treated with weekly high dose of Vitamin D for this.  She is not currently taking any vitamin D supplementation.  She admits that she does not like to take medications daily.  She would prefer weekly dose supplementation.   Latest Reference Range & Units 03/24/23 10:15   25-Hydroxy   Vitamin D 25 30 - 100 ng/mL  30 - 100 ng/mL 23 (L)  23 (L)   (L): Data is abnormally low    Prediabetes   Latest Reference Range & Units 03/24/23 10:15   Glycohemoglobin 4.0 - 5.6 % 6.0 (H)   (H): Data is abnormally high  Past Medical History:   Diagnosis Date    Anemia 2002    Chest pain, rule out acute myocardial infarction 8/27/2015    GERD (gastroesophageal reflux disease) 8/29/2011    RUQ abdominal pain 8/29/2011     Family History   Problem Relation Age of Onset    Diabetes Mother     Hypertension Mother     Hyperlipidemia Mother     Stroke Mother     Heart Disease Mother 60    Hypertension Father     Cancer Paternal Uncle         several uncles iwth stomach cancer of some kind     Past Surgical History:   Procedure " Laterality Date    APPENDECTOMY      CHOLECYSTECTOMY      LUMPECTOMY      2003    PRIMARY C SECTION      repeat c section time 2    TUBAL COAGULATION LAPAROSCOPIC BILATERAL       Social History     Tobacco Use    Smoking status: Never    Smokeless tobacco: Never    Tobacco comments:     very occasional   Vaping Use    Vaping Use: Never used   Substance Use Topics    Alcohol use: Yes     Comment: socially    Drug use: No     Social History     Social History Narrative    Not on file     Current Outpatient Medications Ordered in Epic   Medication Sig Dispense Refill    Cholecalciferol (VITAMIN D3) 1.25 MG (95915 UT) Cap Take 1 Capsule by mouth every 7 days. 12 Capsule 1    pantoprazole (PROTONIX) 40 MG Tablet Delayed Response Take 1 Tablet by mouth every day for 30 days. 30 Tablet 3    amoxicillin (AMOXIL) 500 MG Cap Take 2 Capsules by mouth 2 times a day for 14 days. 28 Capsule 0    metroNIDAZOLE (FLAGYL) 500 MG Tab Take 1 Tablet by mouth 2 times a day for 14 days. 28 Tablet 0    cyclobenzaprine (FLEXERIL) 10 mg Tab Take 1 Tablet by mouth 3 times a day as needed for Muscle Spasms. (Patient not taking: Reported on 3/23/2023) 20 Tablet 0    vitamin D2, Ergocalciferol, (DRISDOL) 1.25 MG (36805 UT) Cap capsule TAKE 1 CAPSULE BY MOUTH EVERY 7 DAYS (Patient not taking: Reported on 3/2/2023) 12 Capsule 0    Naproxen Sodium (ALEVE PO) Take  by mouth. (Patient not taking: Reported on 3/2/2023)      Acetaminophen (TYLENOL PO) Take  by mouth. (Patient not taking: Reported on 3/23/2023)      albuterol 108 (90 Base) MCG/ACT Aero Soln inhalation aerosol Inhale 2 Puffs every four hours as needed for Shortness of Breath. (Patient not taking: Reported on 3/2/2023) 1 Each 1    escitalopram (LEXAPRO) 10 MG Tab Take 1 Tablet by mouth every day. (Patient not taking: Reported on 3/2/2023) 90 Tablet 0    EPINEPHrine (EPIPEN) 0.3 MG/0.3ML Solution Auto-injector solution for injection  (Patient not taking: Reported on 3/2/2023)       "Budesonide, Inhalation, 1 MG/2ML Suspension USE 1 VIAL VIA NEBILIZER EVERY 12 HOURS (Patient not taking: Reported on 3/2/2023)      pantoprazole (PROTONIX) 40 MG Tablet Delayed Response Take 1 tablet by mouth every day. (Patient not taking: Reported on 3/2/2023) 45 tablet 0     No current Epic-ordered facility-administered medications on file.     Seasonal    ROS: see hpi  Gen: no fevers/chills  Pulm: no sob, no cough  CV: no chest pain, no palpitations, no edema  GI: no nausea/vomiting, no diarrhea  Skin: no rash    Objective:   Exam:  /78 (BP Location: Left arm, Patient Position: Sitting, BP Cuff Size: Adult)   Pulse 69   Temp 36.3 °C (97.4 °F) (Temporal)   Resp 16   Ht 1.626 m (5' 4\") Comment: pt reported  Wt 78 kg (172 lb)   SpO2 95%   BMI 29.52 kg/m²    Body mass index is 29.52 kg/m².    Gen: Alert and oriented, No apparent distress.  HEENT: Head atraumatic, normocephalic. Pupils equal and round.  Neck: Neck is supple without lymphadenopathy.   Lungs: Normal effort, CTA bilaterally, no wheezes, rhonchi, or rales  CV: Regular rate and rhythm. No murmurs, rubs, or gallops.  ABD: +BS. Non-tender, non-distended. No rebound, rigidity, or guarding.  Ext: No clubbing, cyanosis, edema.    Assessment & Plan:     52 y.o. female with the following -     1. Epigastric abdominal pain  3. H. pylori infection  5. Neutrophilic leukocytosis  New and improved condition.  Patient presentation likely due to H. pylori.  Will treat with Protonix, amoxicillin, and metronidazole. Medication administration and side effects discussed.  Patient agreeable to this. Recommend to increase fluid intake with electrolytes.  Avoid known food triggers such as alcohol, spicy, acidic, fatty, dairy, carbonated.  Avoid NSAID use.  Patient has pending referral to GI.  For worsening symptoms, advised to go to ED for immediate care.    - pantoprazole (PROTONIX) 40 MG Tablet Delayed Response; Take 1 Tablet by mouth every day for 30 days.  " Dispense: 30 Tablet; Refill: 3  - amoxicillin (AMOXIL) 500 MG Cap; Take 2 Capsules by mouth 2 times a day for 14 days.  Dispense: 28 Capsule; Refill: 0  - metroNIDAZOLE (FLAGYL) 500 MG Tab; Take 1 Tablet by mouth 2 times a day for 14 days.  Dispense: 28 Tablet; Refill: 0  - CBC WITHOUT DIFFERENTIAL; Future    2. Vitamin D insufficiency  Chronic condition.  Will treat with vitamin D3 50,000 weekly for 3 months.  Instructed to take with food for better absorption.  Recommend to start vitamin D3 2,000 IU daily after 3 months.     - VITAMIN D,25 HYDROXY (DEFICIENCY); Future  - Cholecalciferol (VITAMIN D3) 1.25 MG (13647 UT) Cap; Take 1 Capsule by mouth every 7 days.  Dispense: 12 Capsule; Refill: 1    4. Pre-diabetes  New finding. Discussed importance of glucose control to reduce risk of progression to diabetes and cardiovascular disease such as heart attack and stroke; neuropathy, retinopathy, and kidney failure.   Recommend diet low in sugar, simple carbs, saturated fats, and high in lean proteins, fresh vegetables, fiber.  Advised to start exercise regimen of moderate intensity at least 30 minutes per day, 5 days a week.     - HEMOGLOBIN A1C; Future    6. Elevated ALT measurement  New finding. Likely reactive to current infection.  Rest of liver panel, GGT, lipase, amylase, hepatitis, bilirubin, albumin are unremarkable. No anemia, excessive etoh or tylenol consumption, thyroid disorder. Will re-check lab.    - Comp Metabolic Panel; Future    Return in about 4 weeks (around 4/25/2023), or if symptoms worsen or fail to improve.    ROCÍO Valenzuela.   [facility]    Please note that this dictation was created using voice recognition software. I have made every reasonable attempt to correct obvious errors, but I expect that there are errors of grammar and possibly content that I did not discover before finalizing the note.

## 2023-03-29 PROBLEM — M65.341 TRIGGER FINGER, RIGHT RING FINGER: Status: ACTIVE | Noted: 2023-03-29

## 2023-03-29 PROBLEM — M65.331 TRIGGER MIDDLE FINGER OF RIGHT HAND: Status: ACTIVE | Noted: 2023-03-29

## 2023-03-29 PROBLEM — G56.01 CARPAL TUNNEL SYNDROME ON RIGHT: Status: ACTIVE | Noted: 2023-03-29

## 2023-03-30 LAB — OVA AND PARASITE, FECAL INTERPRETATION Q0595: POSITIVE

## 2023-03-31 ENCOUNTER — HOSPITAL ENCOUNTER (OUTPATIENT)
Dept: RADIOLOGY | Facility: MEDICAL CENTER | Age: 53
End: 2023-03-31
Payer: COMMERCIAL

## 2023-03-31 DIAGNOSIS — R53.81 MALAISE AND FATIGUE: ICD-10-CM

## 2023-03-31 DIAGNOSIS — Z91.89 AT RISK FOR INFECTIOUS DISEASE DUE TO RECENT FOREIGN TRAVEL: ICD-10-CM

## 2023-03-31 DIAGNOSIS — R53.83 MALAISE AND FATIGUE: ICD-10-CM

## 2023-03-31 DIAGNOSIS — R11.2 NAUSEA AND VOMITING, UNSPECIFIED VOMITING TYPE: ICD-10-CM

## 2023-03-31 DIAGNOSIS — R10.13 EPIGASTRIC PAIN: ICD-10-CM

## 2023-03-31 DIAGNOSIS — R07.89 FEELING OF CHEST TIGHTNESS: ICD-10-CM

## 2023-03-31 DIAGNOSIS — R69 ILLNESS ASSOCIATED WITH TRAVEL: ICD-10-CM

## 2023-03-31 PROCEDURE — 76700 US EXAM ABDOM COMPLETE: CPT

## 2023-03-31 PROCEDURE — 71046 X-RAY EXAM CHEST 2 VIEWS: CPT

## 2023-04-04 ENCOUNTER — TELEPHONE (OUTPATIENT)
Dept: MEDICAL GROUP | Facility: IMAGING CENTER | Age: 53
End: 2023-04-04
Payer: COMMERCIAL

## 2023-04-04 NOTE — TELEPHONE ENCOUNTER
Caller Name: Gely Cottrell   Call Back Number: [phone] (home)      How would the patient prefer to be contacted with a response: Phone call do NOT leave a detailed message    Pt called stating that the medication for her H. Pylori is making her feel dizzy, nauseous and weak. It is also making her vomit.       Kelly, please advise.     Thank you

## 2023-04-05 ENCOUNTER — TELEPHONE (OUTPATIENT)
Dept: MEDICAL GROUP | Facility: IMAGING CENTER | Age: 53
End: 2023-04-05
Payer: COMMERCIAL

## 2023-04-05 DIAGNOSIS — A04.8 H. PYLORI INFECTION: ICD-10-CM

## 2023-04-05 RX ORDER — CLARITHROMYCIN 500 MG/1
500 TABLET, COATED ORAL 2 TIMES DAILY
Qty: 20 TABLET | Refills: 0 | Status: SHIPPED | OUTPATIENT
Start: 2023-04-05 | End: 2024-02-12

## 2023-04-05 NOTE — TELEPHONE ENCOUNTER
Patient reports developing side effects of nausea, vomiting, and dizziness with amoxicillin. She completed 7 days of amoxicllin. She has zofran PRN. Will switch abx to clarithromycin for 7 days to complete abx regime. Pt agreeable to this.

## 2023-05-23 ENCOUNTER — OFFICE VISIT (OUTPATIENT)
Dept: MEDICAL GROUP | Facility: IMAGING CENTER | Age: 53
End: 2023-05-23
Payer: COMMERCIAL

## 2023-05-23 VITALS
SYSTOLIC BLOOD PRESSURE: 124 MMHG | HEART RATE: 81 BPM | TEMPERATURE: 97.5 F | HEIGHT: 63 IN | RESPIRATION RATE: 16 BRPM | WEIGHT: 165 LBS | DIASTOLIC BLOOD PRESSURE: 78 MMHG | OXYGEN SATURATION: 96 % | BODY MASS INDEX: 29.23 KG/M2

## 2023-05-23 DIAGNOSIS — R10.13 EPIGASTRIC PAIN: ICD-10-CM

## 2023-05-23 DIAGNOSIS — A04.8 H. PYLORI INFECTION: ICD-10-CM

## 2023-05-23 DIAGNOSIS — R10.9 ABDOMINAL PAIN, ACUTE: ICD-10-CM

## 2023-05-23 DIAGNOSIS — R14.0 BLOATED ABDOMEN: ICD-10-CM

## 2023-05-23 PROCEDURE — 99213 OFFICE O/P EST LOW 20 MIN: CPT

## 2023-05-23 PROCEDURE — 3078F DIAST BP <80 MM HG: CPT

## 2023-05-23 PROCEDURE — 3074F SYST BP LT 130 MM HG: CPT

## 2023-05-23 RX ORDER — PANTOPRAZOLE SODIUM 40 MG/1
40 TABLET, DELAYED RELEASE ORAL DAILY
Qty: 45 TABLET | Refills: 0 | Status: SHIPPED | OUTPATIENT
Start: 2023-05-23 | End: 2023-11-21

## 2023-05-23 ASSESSMENT — FIBROSIS 4 INDEX: FIB4 SCORE: 0.7

## 2023-05-23 NOTE — PROGRESS NOTES
Subjective:     CC:   Chief Complaint   Patient presents with    Follow-Up     Pylori infection       HPI:   Gely presents today to discuss:    Epigastric abdominal pain  H. pylori infection  Patient is here for follow up on H pylori. She completed abx therapy and is currently taking Protonix. She was doing well. However, she developed flare up of her symptoms last week. She admits that she was not as strict with her diet due to family being in town.  She is back on dietary restrictions.  Her symptoms are improving.  She has not been able to establish with GI. But will make an appointment asap.   She is working on healthy weight loss.  She has lost 12 lbs since March.         Past Medical History:   Diagnosis Date    Anemia 2002    Chest pain, rule out acute myocardial infarction 8/27/2015    GERD (gastroesophageal reflux disease) 8/29/2011    RUQ abdominal pain 8/29/2011     Family History   Problem Relation Age of Onset    Diabetes Mother     Hypertension Mother     Hyperlipidemia Mother     Stroke Mother     Heart Disease Mother 60    Hypertension Father     Cancer Paternal Uncle         several uncles iwth stomach cancer of some kind     Past Surgical History:   Procedure Laterality Date    APPENDECTOMY      CHOLECYSTECTOMY      LUMPECTOMY      2003    PRIMARY C SECTION      repeat c section time 2    TUBAL COAGULATION LAPAROSCOPIC BILATERAL       Social History     Tobacco Use    Smoking status: Never    Smokeless tobacco: Never    Tobacco comments:     very occasional   Vaping Use    Vaping Use: Never used   Substance Use Topics    Alcohol use: Yes     Comment: socially    Drug use: No     Social History     Social History Narrative    Not on file     Current Outpatient Medications Ordered in Epic   Medication Sig Dispense Refill    pantoprazole (PROTONIX) 40 MG Tablet Delayed Response Take 1 Tablet by mouth every day. 45 Tablet 0    Cholecalciferol (VITAMIN D3) 1.25 MG (53393 UT) Cap Take 1 Capsule by mouth  "every 7 days. 12 Capsule 1    clarithromycin (BIAXIN) 500 MG Tab Take 1 Tablet by mouth 2 times a day. (Patient not taking: Reported on 5/23/2023) 20 Tablet 0    cyclobenzaprine (FLEXERIL) 10 mg Tab Take 1 Tablet by mouth 3 times a day as needed for Muscle Spasms. (Patient not taking: Reported on 3/23/2023) 20 Tablet 0    vitamin D2, Ergocalciferol, (DRISDOL) 1.25 MG (35611 UT) Cap capsule TAKE 1 CAPSULE BY MOUTH EVERY 7 DAYS (Patient not taking: Reported on 3/2/2023) 12 Capsule 0    Naproxen Sodium (ALEVE PO) Take  by mouth. (Patient not taking: Reported on 3/2/2023)      Acetaminophen (TYLENOL PO) Take  by mouth. (Patient not taking: Reported on 3/23/2023)      albuterol 108 (90 Base) MCG/ACT Aero Soln inhalation aerosol Inhale 2 Puffs every four hours as needed for Shortness of Breath. (Patient not taking: Reported on 3/2/2023) 1 Each 1    escitalopram (LEXAPRO) 10 MG Tab Take 1 Tablet by mouth every day. (Patient not taking: Reported on 3/2/2023) 90 Tablet 0    EPINEPHrine (EPIPEN) 0.3 MG/0.3ML Solution Auto-injector solution for injection  (Patient not taking: Reported on 3/2/2023)      Budesonide, Inhalation, 1 MG/2ML Suspension USE 1 VIAL VIA NEBILIZER EVERY 12 HOURS (Patient not taking: Reported on 3/2/2023)       No current [facility]-ordered facility-administered medications on file.     Seasonal    ROS: see hpi  Gen: no fevers/chills  Pulm: no sob, no cough  CV: no chest pain, no palpitations, no edema  GI: no nausea/vomiting, no diarrhea  Skin: no rash    Objective:   Exam:  /78 (BP Location: Left arm, Patient Position: Sitting, BP Cuff Size: Adult)   Pulse 81   Temp 36.4 °C (97.5 °F)   Resp 16   Ht 1.6 m (5' 3\") Comment: pt reported  Wt 74.8 kg (165 lb)   SpO2 96%   BMI 29.23 kg/m²    Body mass index is 29.23 kg/m².    Gen: Alert and oriented, No apparent distress.  HEENT: Head atraumatic, normocephalic. Pupils equal and round.  Neck: Neck is supple without lymphadenopathy.   Lungs: Normal " effort, CTA bilaterally, no wheezes, rhonchi, or rales  CV: Regular rate and rhythm. No murmurs, rubs, or gallops.  ABD: +BS. Non-tender, non-distended. No rebound, rigidity, or guarding.  Ext: No clubbing, cyanosis, edema.    Assessment & Plan:     52 y.o. female with the following -     1. H. pylori infection  2. Epigastric pain  3. Abdominal pain, acute  4. Bloated abdomen  Established and resolving condition.  However, she did have a recent flareup of symptoms due to noncompliance with dietary restrictions.  Recommend to continue Protonix. Recommend to continue low FODMAP diet. Avoid known food triggers such as alcohol, spicy, acidic, fatty, dairy, carbonated.  Avoid NSAID use.  May start probiotics, digestive healthy enzymes, and omega-3 fatty acid supplements.   Will place referral to [facility].  ED symptoms discussed.    - Referral to Gastroenterology  - pantoprazole (PROTONIX) 40 MG Tablet Delayed Response; Take 1 Tablet by mouth every day.  Dispense: 45 Tablet; Refill: 0    Medical Decision Making/Course:  In the course of preparing for this visit with review of the pertinent past medical history, recent and past clinic visits, current medications, and performing chart, immunization, medical history and medication reconciliation, and in the further course of obtaining the current history pertinent to the clinic visit today, performing an exam and evaluation, ordering and independently evaluating labs, tests, and/or procedures, prescribing any recommended new medications as noted above, providing any pertinent counseling and education and recommending further coordination of care. This was discussed with patient in a shared-decision making conversation, and they understand and agreed with plan of care.     Return if symptoms worsen or fail to improve.    ROCÍO Valenzuela.   [facility]    Please note that this dictation was created using voice recognition software. I have made  every reasonable attempt to correct obvious errors, but I expect that there are errors of grammar and possibly content that I did not discover before finalizing the note.

## 2023-08-24 ENCOUNTER — PATIENT MESSAGE (OUTPATIENT)
Dept: HEALTH INFORMATION MANAGEMENT | Facility: OTHER | Age: 53
End: 2023-08-24

## 2023-11-21 ENCOUNTER — OFFICE VISIT (OUTPATIENT)
Dept: MEDICAL GROUP | Facility: IMAGING CENTER | Age: 53
End: 2023-11-21
Payer: COMMERCIAL

## 2023-11-21 VITALS
HEIGHT: 64 IN | RESPIRATION RATE: 16 BRPM | TEMPERATURE: 98.2 F | HEART RATE: 85 BPM | BODY MASS INDEX: 28.38 KG/M2 | SYSTOLIC BLOOD PRESSURE: 120 MMHG | DIASTOLIC BLOOD PRESSURE: 72 MMHG | OXYGEN SATURATION: 97 % | WEIGHT: 166.2 LBS

## 2023-11-21 DIAGNOSIS — K22.70 BARRETT'S ESOPHAGUS WITHOUT DYSPLASIA: ICD-10-CM

## 2023-11-21 DIAGNOSIS — J98.01 BRONCHOSPASM: ICD-10-CM

## 2023-11-21 DIAGNOSIS — E55.9 VITAMIN D INSUFFICIENCY: ICD-10-CM

## 2023-11-21 DIAGNOSIS — Z12.31 ENCOUNTER FOR SCREENING MAMMOGRAM FOR MALIGNANT NEOPLASM OF BREAST: ICD-10-CM

## 2023-11-21 DIAGNOSIS — M25.541 JOINT PAIN IN FINGERS OF BOTH HANDS: ICD-10-CM

## 2023-11-21 DIAGNOSIS — M25.542 JOINT PAIN IN FINGERS OF BOTH HANDS: ICD-10-CM

## 2023-11-21 DIAGNOSIS — Z00.00 WELLNESS EXAMINATION: ICD-10-CM

## 2023-11-21 DIAGNOSIS — R73.03 PRE-DIABETES: Chronic | ICD-10-CM

## 2023-11-21 DIAGNOSIS — R53.82 CHRONIC FATIGUE: ICD-10-CM

## 2023-11-21 DIAGNOSIS — R74.8 LIVER ENZYME ELEVATION: ICD-10-CM

## 2023-11-21 DIAGNOSIS — N91.2 AMENORRHEA: ICD-10-CM

## 2023-11-21 PROBLEM — K57.30 DIVERTICULOSIS OF LARGE INTESTINE WITHOUT PERFORATION OR ABSCESS WITHOUT BLEEDING: Status: RESOLVED | Noted: 2023-08-14 | Resolved: 2023-11-21

## 2023-11-21 PROCEDURE — 3074F SYST BP LT 130 MM HG: CPT

## 2023-11-21 PROCEDURE — 99396 PREV VISIT EST AGE 40-64: CPT

## 2023-11-21 PROCEDURE — 3078F DIAST BP <80 MM HG: CPT

## 2023-11-21 RX ORDER — PANTOPRAZOLE SODIUM 20 MG/1
20 TABLET, DELAYED RELEASE ORAL DAILY
Qty: 90 TABLET | Refills: 2 | Status: SHIPPED | OUTPATIENT
Start: 2023-11-21 | End: 2024-02-19

## 2023-11-21 RX ORDER — ALBUTEROL SULFATE 90 UG/1
2 AEROSOL, METERED RESPIRATORY (INHALATION) EVERY 4 HOURS PRN
Qty: 1 EACH | Refills: 1 | Status: SHIPPED | OUTPATIENT
Start: 2023-11-21

## 2023-11-21 ASSESSMENT — FIBROSIS 4 INDEX: FIB4 SCORE: 0.71

## 2023-11-21 NOTE — PROGRESS NOTES
Subjective:     CC:   Chief Complaint   Patient presents with    Annual Exam     Physical     Follow-Up     On the colonoscopy result 8/15       HPI:   Gely Cottrell is a 53 y.o. female who presents for annual exam. She is feeling well and denies any complaints.    2023   Colonoscopy results revealed 1 adenoma polyp which was removed.  It is recommended to repeat in 3 to 7 years.      EGD-H. pylori was not found.  She did have mild inflammation-peptic duodenitis.  No sign of celiac disease. She was advised to start Pantoprazole 20 mg daily. However, patient was not aware of this and has not been doing so.     Amenorrhea-new and ongoing issue for 7-8 months. She is requesting labs.     Chronic fatigue- ongoing issue. She is requesting labs.    Difficulty with weight loss-patient reports difficulty with weight loss despite healthy diet.  However, she admits that she does not exercise regularly.  She goes to [employer] a few times a week.     Joint pain in fingers of both hands-chronic and worsening condition. Pt reports having osteopenia of her hands. She has family history of RA. She is starting to notice joint deformity and is having difficulty with her  due to pain.    Ob-Gyn/ History:    Patient has GYN provider:   /Para: 3   Last Pap Smear:  . no history of abnormal pap smears.  Gyn Surgery:  tubal ligation and   Current Contraceptive Method:  no. yes currently sexually active.  Last menstrual period:  7-8 months ago.  Periods regular. spotting, light bleeding. No significant bloating/fluid retention, pelvic pain, or dyspareunia. No vaginal discharge  Post-menopausal bleeding: no  Urinary incontinence: no    Health Maintenance  Cholesterol Screening: ordered   Diabetes Screening: ordered   Diet: well-balanced, trying not to eat too much red meat, low carbs, more vegetables  Exercise: no regular exercise, stays active, goes to [employer] 3x a week   Substance Abuse: no   Safe in  relationship. yes   Seat belts, bike helmet, gun safety discussed.  Sun protection used.  Dentist: yes  Eye Doctor: yes    Cancer screening  Colorectal Cancer Screening: up to date    Lung Cancer Screening: no    Cervical Cancer Screening: up to date   Breast Cancer Screening: due     Infectious disease screening/Immunizations  --STI Screening: decline   --Practices safe sex.  --HIV Screening: up to date   --Hepatitis C Screening: up to date   --Immunizations: needs to be updated        She  has a past medical history of Anemia (2002), Chest pain, rule out acute myocardial infarction (8/27/2015), GERD (gastroesophageal reflux disease) (8/29/2011), and RUQ abdominal pain (8/29/2011).    She has no past medical history of Breast cancer (HCC).  She  has a past surgical history that includes appendectomy; cholecystectomy; primary c section; tubal coagulation laparoscopic bilateral; and lumpectomy.    Family History   Problem Relation Age of Onset    Diabetes Mother     Hypertension Mother     Hyperlipidemia Mother     Stroke Mother     Heart Disease Mother 60    Hypertension Father     Cancer Paternal Uncle         several uncles iwth stomach cancer of some kind       Social History     Socioeconomic History    Marital status:      Spouse name: Not on file    Number of children: Not on file    Years of education: Not on file    Highest education level: Not on file   Occupational History    Not on file   Tobacco Use    Smoking status: Never    Smokeless tobacco: Never    Tobacco comments:     very occasional   Vaping Use    Vaping Use: Never used   Substance and Sexual Activity    Alcohol use: Yes     Comment: socially    Drug use: No    Sexual activity: Yes     Partners: Male     Birth control/protection: Surgical   Other Topics Concern    Not on file   Social History Narrative    Not on file     Social Determinants of Health     Financial Resource Strain: Not on file   Food Insecurity: Not on file    Transportation Needs: Not on file   Physical Activity: Not on file   Stress: Not on file   Social Connections: Not on file   Intimate Partner Violence: Not on file   Housing Stability: Not on file       Patient Active Problem List    Diagnosis Date Noted    Carpal tunnel syndrome on right 03/29/2023    Trigger finger, right ring finger 03/29/2023    Trigger middle finger of right hand 03/29/2023    H. pylori infection 03/28/2023    Vitamin D insufficiency 03/28/2023    Osteopenia of hand, bilateral 04/12/2022    Bloated abdomen 04/12/2022    Anxiety and depression 04/12/2022    Other headache syndrome 04/12/2022    Pre-diabetes 07/14/2021    IGT (impaired glucose tolerance) 06/01/2021    Transaminitis 06/01/2021    Hypokalemia 06/01/2021    Cough 05/25/2021    Neuropathy 05/25/2021    Abdominal pain, acute 08/27/2015    Diffuse pain 08/27/2015    Hypocalcemia 08/27/2015    Hyperparathyroidism due to vitamin D deficiency (HCC) 08/27/2015    GERD (gastroesophageal reflux disease) 08/29/2011    RUQ abdominal pain 08/29/2011    History of cholecystectomy 08/29/2011         Current Outpatient Medications   Medication Sig Dispense Refill    pantoprazole (PROTONIX) 40 MG Tablet Delayed Response Take 1 Tablet by mouth every day. 45 Tablet 0    clarithromycin (BIAXIN) 500 MG Tab Take 1 Tablet by mouth 2 times a day. (Patient not taking: Reported on 5/23/2023) 20 Tablet 0    Cholecalciferol (VITAMIN D3) 1.25 MG (78336 UT) Cap Take 1 Capsule by mouth every 7 days. (Patient not taking: Reported on 11/21/2023) 12 Capsule 1    cyclobenzaprine (FLEXERIL) 10 mg Tab Take 1 Tablet by mouth 3 times a day as needed for Muscle Spasms. (Patient not taking: Reported on 3/23/2023) 20 Tablet 0    vitamin D2, Ergocalciferol, (DRISDOL) 1.25 MG (39382 UT) Cap capsule TAKE 1 CAPSULE BY MOUTH EVERY 7 DAYS (Patient not taking: Reported on 3/2/2023) 12 Capsule 0    Naproxen Sodium (ALEVE PO) Take  by mouth. (Patient not taking: Reported on  "3/2/2023)      Acetaminophen (TYLENOL PO) Take  by mouth. (Patient not taking: Reported on 3/23/2023)      albuterol 108 (90 Base) MCG/ACT Aero Soln inhalation aerosol Inhale 2 Puffs every four hours as needed for Shortness of Breath. (Patient not taking: Reported on 3/2/2023) 1 Each 1    escitalopram (LEXAPRO) 10 MG Tab Take 1 Tablet by mouth every day. (Patient not taking: Reported on 3/2/2023) 90 Tablet 0    EPINEPHrine (EPIPEN) 0.3 MG/0.3ML Solution Auto-injector solution for injection  (Patient not taking: Reported on 3/2/2023)      Budesonide, Inhalation, 1 MG/2ML Suspension USE 1 VIAL VIA NEBILIZER EVERY 12 HOURS (Patient not taking: Reported on 3/2/2023)       No current facility-administered medications for this visit.     Allergies   Allergen Reactions    Seasonal      Review of Systems   Constitutional: Negative for fever, chills and malaise/fatigue.   HENT: Negative for congestion.    Eyes: Negative for pain.    Respiratory: Negative for cough and shortness of breath.  Cardiovascular: Negative for leg swelling.   Gastrointestinal: Negative for nausea, vomiting, abdominal pain and diarrhea.   Genitourinary: Negative for dysuria and hematuria.   Skin: Negative for rash.   Neurological: Negative for dizziness, focal weakness and headaches.   Endo/Heme/Allergies: Does not bleed easily.   Psychiatric/Behavioral: Negative for depression.  The patient is not nervous/anxious.      Objective:     /72 (BP Location: Left arm, Patient Position: Sitting, BP Cuff Size: Adult)   Pulse 85   Temp 36.8 °C (98.2 °F) (Temporal)   Resp 16   Ht 1.626 m (5' 4\")   Wt 75.4 kg (166 lb 3.2 oz)   SpO2 97%   BMI 28.53 kg/m²   Body mass index is 28.53 kg/m².  Wt Readings from Last 4 Encounters:   11/21/23 75.4 kg (166 lb 3.2 oz)   05/23/23 74.8 kg (165 lb)   03/28/23 78 kg (172 lb)   03/23/23 80.3 kg (177 lb)       Physical Exam:  Constitutional: Well-developed and well-nourished. Not diaphoretic. No distress.   Skin: " Skin is warm and dry. No rash noted.  Head: Atraumatic without lesions.  Eyes: Conjunctivae and extraocular motions are normal. Pupils are equal, round, and reactive to light. No scleral icterus.   Ears:  External ears unremarkable. Tympanic membranes clear and intact.  Nose: Nares patent. Septum midline. Turbinates without erythema nor edema. No discharge.   Mouth/Throat:   Neck: Supple, trachea midline. Normal range of motion. No thyromegaly present. No lymphadenopathy--cervical or supraclavicular.  Cardiovascular: Regular rate and rhythm, S1 and S2 without murmur, rubs, or gallops.  Lungs: Normal inspiratory effort, CTA bilaterally, no wheezes/rhonchi/rales  Abdomen: Soft, non tender, and without distention. Active bowel sounds in all four quadrants. No rebound, guarding, masses or HSM.  Extremities: No cyanosis, clubbing, erythema, nor edema. Distal pulses intact and symmetric.   Musculoskeletal: All major joints AROM full in all directions without pain.  Neurological: Alert and oriented x 3. DTRs 2+/3 and symmetric. No cranial nerve deficit. 5/5 myotomes. Sensation intact.   Psychiatric:  Behavior, mood, and affect are appropriate.    Assessment and Plan:     1. Wellness examination  PMH/PSH/FH/Social history reviewed. Medication reconciled. Vaccinations discussed. Previous records and labs reviewed. Discussed age appropriate anticipatory guidance.  Will screen for anemia, thyroid disorder, metabolic disorder, cardiovascular disease, diabetes, and vitamin deficiency. Will order labs.    - CBC WITH DIFFERENTIAL; Future  - Comp Metabolic Panel; Future  - Lipid Profile; Future  - TSH WITH REFLEX TO FT4; Future  - VITAMIN D,25 HYDROXY (DEFICIENCY); Future  - HEMOGLOBIN A1C; Future    2. Pennington's esophagus without dysplasia  Established condition.  Patient is currently not taking protonix per GI recommendation.  Instructed patient to start pantoprazole daily.  Medication sent to pharmacy.  Medication administration  and side effects discussed.  Lifestyle and dietary measures include:  -Weight loss  -Elevate head of bed at night if having symptoms at night.  -Refrain from laying flat on back after meals  -Avoid eating meals 2 to 3 hours before bedtime.  -Avoid tight fitting clothes  -Eliminate dietary triggers such as: caffeine, chocolate, spicy food, fatty/fried foods, carbonated beverages, peppermint and acidic food or a drinks  -Avoid tobacco and alcohol use  -Decrease or eliminate use of NSAIDs  Recommend follow-up with GI.    - pantoprazole (PROTONIX) 20 MG tablet; Take 1 Tablet by mouth every day for 90 days.  Dispense: 90 Tablet; Refill: 2  - CBC WITH DIFFERENTIAL; Future    3. Bronchospasm  Established condition.  Stable on albuterol inhaler as needed.  Med refill sent to pharmacy.  Medication administration and side effects discussed.    - albuterol 108 (90 Base) MCG/ACT Aero Soln inhalation aerosol; Inhale 2 Puffs every four hours as needed for Shortness of Breath.  Dispense: 1 Each; Refill: 1    4. Amenorrhea  New condition.  Likely in menopausal state.  Patient is requesting labs to evaluate.    - TSH WITH REFLEX TO FT4; Future  - TESTOSTERONE F&T FEMALES/CHILD; Future  - FSH/LH; Future  - ESTRADIOL; Future  - PROGESTERONE; Future  - PROLACTIN; Future  - INSULIN FASTING; Future    5. Chronic fatigue  Chronic and ongoing condition.  Etiology unknown.  Will order labs to evaluate.    - CBC WITH DIFFERENTIAL; Future  - Comp Metabolic Panel; Future  - Lipid Profile; Future  - TSH WITH REFLEX TO FT4; Future  - VITAMIN D,25 HYDROXY (DEFICIENCY); Future  - HEMOGLOBIN A1C; Future  - TESTOSTERONE F&T FEMALES/CHILD; Future  - FSH/LH; Future  - ESTRADIOL; Future  - PROGESTERONE; Future  - PROLACTIN; Future  - INSULIN FASTING; Future  - RHEUMATOID ARTHRITIS FACTOR; Future  - GILMER REFLEXIVE PROFILE; Future    6. Joint pain in fingers of both hands  Chronic and ongoing condition.  Will order labs to rule out RA.   Recommend  conservative therapy:  -Avoid activities that provoke pain, repetitive wrist flexion or extension  -Employ ergonomics (wrist rests, adjust chair/desk, voice recognition software)  -Use wrist brace or splint to keep wrist in a neutral position  -Start wrist and hand exercises daily  -Use OTC Tylenol for pain relief  -May apply topical Arnicare or IcyHot to affected sites    - RHEUMATOID ARTHRITIS FACTOR; Future  - GILMER REFLEXIVE PROFILE; Future    7. Pre-diabetes    - Comp Metabolic Panel; Future  - HEMOGLOBIN A1C; Future    8. Liver enzyme elevation    - Comp Metabolic Panel; Future    9. Vitamin D insufficiency    - VITAMIN D,25 HYDROXY (DEFICIENCY); Future    10. Encounter for screening mammogram for malignant neoplasm of breast    - MA-SCREENING MAMMO BILAT W/TOMOSYNTHESIS W/CAD; Future    HCM:  completed   Labs per orders  Immunizations per orders  Patient counseled about skin care, diet, supplements, prenatal vitamins, safe sex and exercise.    Medical Decision Making/Course:  In the course of preparing for this visit with review of the pertinent past medical history, recent and past clinic visits, current medications, and performing chart, immunization, medical history and medication reconciliation, and in the further course of obtaining the current history pertinent to the clinic visit today, performing an exam and evaluation, ordering and independently evaluating labs, tests, and/or procedures, prescribing any recommended new medications as noted above, providing any pertinent counseling and education and recommending further coordination of care. This was discussed with patient in a shared-decision making conversation, and they understand and agreed with plan of care.     -Smoking cessation discussed if smoking and encouraged to come to office if quit and tempted or restarts smoking if pertinent to this patient. We discourage use of electronic smoking devises.   -Discussed healthy drinking habits if over 7  for females, 14 for males per week or more than 4 in one day.  -Discussed healthy eating habits, exercise, being physically active, healthy bmi below 25  -patient to provide ages of family members when they were diagnosed with cancer , especially breast and ovarian, pancreatic cancers.  -Reviewed pap history in female patients, if they have a gynecologist they are encouraged to follow up with that doctor for annual pelvic and breast exams. If they prefer to see me for women's health, I will perform pap smear with hpv dna testing, pelvic, and breast exam, these and male genital exams are always done in the presence of a medical assistant and with verbal permission from the patient.   -Colonoscopy history reviewed with those over 44yo or those with early family h/o colon cancer. If patient is due we provide them with various colon cancer screen modalities and relevant information to help the patient decide which is best for them.   -Mammograms recommended yearly for women over 41yo. Risks and benefits are reviewed and discussed with the patient and mammogram script provided.   -PSA discussed with males with family history of prostate cancer or those concerned. The decision to order this test is made with the patient.   -If patient prescribed medicines then told to review package insert for any warnings, side effects, contra-indications and medication vs medication reactions.   -STD testing added to lab work due to patients age per guideline recommendations  -Patients screened for anxiety and depression. If positive screening patients are offered behavioral health services, medications, and tools to improve mood.   There are no diagnoses linked to this encounter.  -to improve bone health take calcium and vitamin D, perform weight-bearing exercise, in addition we can discuss additional medications if needed including bisphosphonates, parathyroid hormone, and raloxifene.  Esophageal irritation can occur with  bisphosphonate therapy this can be reduced by not laying down for 30 min after taking and taking with a full glass of water  -if wearing nail polish on toes or hands asked to rto if there are any dark brown or black areas under the nails  If lab tests ordered, then patient instructed to go to lab/location/plan  If imaging tests ordered, then patient instructed to go to radiology/location/plan  If medicines ordered, then patient instructed to go to pharmacy/location/plan  Health maintenance I reviewed both men and women's health maintenance  Leading causes of death are motor vehicle accidents, cardiovascular disease, malignant tumors, and HIV.   Breast and ovarian cancer mutation screening was suggested if there was an increased risk for the patient based on bernardo scoring.   -A general visit to see the eye doctor every one to two years was thought appropriate. Dentist ever 6-12 months.  -Immunization suggestions: Tetanus shot every 10 years, Influenza immunization pneumococcal- anyone with chronic illnesses    Follow-up: No follow-ups on file.    Thank you, Kelly PERALTA  [facility]

## 2023-11-21 NOTE — PATIENT INSTRUCTIONS
GERD Patient instructions:  Lifestyle and dietary measures include:  -Weight loss  -Elevate head of bed at night if having symptoms at night.  -Refrain from laying flat on back after meals  -Avoid eating meals 2 to 3 hours before bedtime.  -Avoid tight fitting clothes  -Eliminate dietary triggers such as: caffeine, chocolate, spicy food, fatty/fried foods, carbonated beverages, peppermint and acidic food or a drinks  -Avoid tobacco and alcohol use  -Decrease or eliminate use of NSAIDs

## 2023-11-28 ENCOUNTER — HOSPITAL ENCOUNTER (OUTPATIENT)
Dept: LAB | Facility: MEDICAL CENTER | Age: 53
End: 2023-11-28
Payer: COMMERCIAL

## 2023-11-28 DIAGNOSIS — M25.542 JOINT PAIN IN FINGERS OF BOTH HANDS: ICD-10-CM

## 2023-11-28 DIAGNOSIS — R73.03 PRE-DIABETES: Chronic | ICD-10-CM

## 2023-11-28 DIAGNOSIS — K22.70 BARRETT'S ESOPHAGUS WITHOUT DYSPLASIA: ICD-10-CM

## 2023-11-28 DIAGNOSIS — E55.9 VITAMIN D INSUFFICIENCY: ICD-10-CM

## 2023-11-28 DIAGNOSIS — M25.541 JOINT PAIN IN FINGERS OF BOTH HANDS: ICD-10-CM

## 2023-11-28 DIAGNOSIS — R53.82 CHRONIC FATIGUE: ICD-10-CM

## 2023-11-28 DIAGNOSIS — R74.8 LIVER ENZYME ELEVATION: ICD-10-CM

## 2023-11-28 DIAGNOSIS — N91.2 AMENORRHEA: ICD-10-CM

## 2023-11-28 DIAGNOSIS — Z00.00 WELLNESS EXAMINATION: ICD-10-CM

## 2023-11-28 LAB
BASOPHILS # BLD AUTO: 0.6 % (ref 0–1.8)
BASOPHILS # BLD: 0.04 K/UL (ref 0–0.12)
EOSINOPHIL # BLD AUTO: 0.25 K/UL (ref 0–0.51)
EOSINOPHIL NFR BLD: 3.5 % (ref 0–6.9)
ERYTHROCYTE [DISTWIDTH] IN BLOOD BY AUTOMATED COUNT: 44.4 FL (ref 35.9–50)
EST. AVERAGE GLUCOSE BLD GHB EST-MCNC: 114 MG/DL
HBA1C MFR BLD: 5.6 % (ref 4–5.6)
HCT VFR BLD AUTO: 44.2 % (ref 37–47)
HGB BLD-MCNC: 15 G/DL (ref 12–16)
IMM GRANULOCYTES # BLD AUTO: 0.02 K/UL (ref 0–0.11)
IMM GRANULOCYTES NFR BLD AUTO: 0.3 % (ref 0–0.9)
LYMPHOCYTES # BLD AUTO: 2.61 K/UL (ref 1–4.8)
LYMPHOCYTES NFR BLD: 36.4 % (ref 22–41)
MCH RBC QN AUTO: 31.1 PG (ref 27–33)
MCHC RBC AUTO-ENTMCNC: 33.9 G/DL (ref 32.2–35.5)
MCV RBC AUTO: 91.7 FL (ref 81.4–97.8)
MONOCYTES # BLD AUTO: 0.39 K/UL (ref 0–0.85)
MONOCYTES NFR BLD AUTO: 5.4 % (ref 0–13.4)
NEUTROPHILS # BLD AUTO: 3.86 K/UL (ref 1.82–7.42)
NEUTROPHILS NFR BLD: 53.8 % (ref 44–72)
NRBC # BLD AUTO: 0 K/UL
NRBC BLD-RTO: 0 /100 WBC (ref 0–0.2)
PLATELET # BLD AUTO: 310 K/UL (ref 164–446)
PMV BLD AUTO: 10 FL (ref 9–12.9)
RBC # BLD AUTO: 4.82 M/UL (ref 4.2–5.4)
WBC # BLD AUTO: 7.2 K/UL (ref 4.8–10.8)

## 2023-11-28 PROCEDURE — 86038 ANTINUCLEAR ANTIBODIES: CPT

## 2023-11-28 PROCEDURE — 82670 ASSAY OF TOTAL ESTRADIOL: CPT

## 2023-11-28 PROCEDURE — 84146 ASSAY OF PROLACTIN: CPT

## 2023-11-28 PROCEDURE — 83002 ASSAY OF GONADOTROPIN (LH): CPT

## 2023-11-28 PROCEDURE — 82306 VITAMIN D 25 HYDROXY: CPT

## 2023-11-28 PROCEDURE — 84144 ASSAY OF PROGESTERONE: CPT

## 2023-11-28 PROCEDURE — 85025 COMPLETE CBC W/AUTO DIFF WBC: CPT

## 2023-11-28 PROCEDURE — 84403 ASSAY OF TOTAL TESTOSTERONE: CPT

## 2023-11-28 PROCEDURE — 84443 ASSAY THYROID STIM HORMONE: CPT

## 2023-11-28 PROCEDURE — 83525 ASSAY OF INSULIN: CPT

## 2023-11-28 PROCEDURE — 80061 LIPID PANEL: CPT

## 2023-11-28 PROCEDURE — 84270 ASSAY OF SEX HORMONE GLOBUL: CPT

## 2023-11-28 PROCEDURE — 80053 COMPREHEN METABOLIC PANEL: CPT

## 2023-11-28 PROCEDURE — 86431 RHEUMATOID FACTOR QUANT: CPT

## 2023-11-28 PROCEDURE — 83001 ASSAY OF GONADOTROPIN (FSH): CPT

## 2023-11-28 PROCEDURE — 36415 COLL VENOUS BLD VENIPUNCTURE: CPT

## 2023-11-28 PROCEDURE — 83036 HEMOGLOBIN GLYCOSYLATED A1C: CPT

## 2023-11-28 PROCEDURE — 84402 ASSAY OF FREE TESTOSTERONE: CPT

## 2023-11-29 LAB
25(OH)D3 SERPL-MCNC: 32 NG/ML (ref 30–100)
ALBUMIN SERPL BCP-MCNC: 4.6 G/DL (ref 3.2–4.9)
ALBUMIN/GLOB SERPL: 2 G/DL
ALP SERPL-CCNC: 95 U/L (ref 30–99)
ALT SERPL-CCNC: 36 U/L (ref 2–50)
ANION GAP SERPL CALC-SCNC: 12 MMOL/L (ref 7–16)
AST SERPL-CCNC: 26 U/L (ref 12–45)
BILIRUB SERPL-MCNC: 0.3 MG/DL (ref 0.1–1.5)
BUN SERPL-MCNC: 14 MG/DL (ref 8–22)
CALCIUM ALBUM COR SERPL-MCNC: 9.1 MG/DL (ref 8.5–10.5)
CALCIUM SERPL-MCNC: 9.6 MG/DL (ref 8.5–10.5)
CHLORIDE SERPL-SCNC: 106 MMOL/L (ref 96–112)
CHOLEST SERPL-MCNC: 197 MG/DL (ref 100–199)
CO2 SERPL-SCNC: 22 MMOL/L (ref 20–33)
CREAT SERPL-MCNC: 0.6 MG/DL (ref 0.5–1.4)
ESTRADIOL SERPL-MCNC: <5 PG/ML
FSH SERPL-ACNC: 68.3 MIU/ML
GFR SERPLBLD CREATININE-BSD FMLA CKD-EPI: 108 ML/MIN/1.73 M 2
GLOBULIN SER CALC-MCNC: 2.3 G/DL (ref 1.9–3.5)
GLUCOSE SERPL-MCNC: 95 MG/DL (ref 65–99)
HDLC SERPL-MCNC: 47 MG/DL
LDLC SERPL CALC-MCNC: 117 MG/DL
LH SERPL-ACNC: 45.4 IU/L
POTASSIUM SERPL-SCNC: 3.9 MMOL/L (ref 3.6–5.5)
PROGEST SERPL-MCNC: 0.15 NG/ML
PROLACTIN SERPL-MCNC: 6.99 NG/ML (ref 2.8–26)
PROT SERPL-MCNC: 6.9 G/DL (ref 6–8.2)
RHEUMATOID FACT SER IA-ACNC: 10 IU/ML (ref 0–14)
SODIUM SERPL-SCNC: 140 MMOL/L (ref 135–145)
TRIGL SERPL-MCNC: 166 MG/DL (ref 0–149)
TSH SERPL DL<=0.005 MIU/L-ACNC: 2.47 UIU/ML (ref 0.38–5.33)

## 2023-11-30 LAB
INSULIN P FAST SERPL-ACNC: 19 UIU/ML (ref 3–25)
NUCLEAR IGG SER QL IA: NORMAL

## 2023-12-03 LAB
SHBG SERPL-SCNC: 42 NMOL/L (ref 17–125)
TESTOST FREE SERPL-MCNC: 1.7 PG/ML (ref 0.6–3.8)
TESTOST SERPL-MCNC: 12 NG/DL (ref 9–55)

## 2023-12-08 ENCOUNTER — HOSPITAL ENCOUNTER (OUTPATIENT)
Dept: RADIOLOGY | Facility: MEDICAL CENTER | Age: 53
End: 2023-12-08
Payer: COMMERCIAL

## 2023-12-08 DIAGNOSIS — Z12.31 ENCOUNTER FOR SCREENING MAMMOGRAM FOR MALIGNANT NEOPLASM OF BREAST: ICD-10-CM

## 2023-12-08 PROCEDURE — 77063 BREAST TOMOSYNTHESIS BI: CPT

## 2024-02-08 ENCOUNTER — TELEPHONE (OUTPATIENT)
Dept: MEDICAL GROUP | Facility: IMAGING CENTER | Age: 54
End: 2024-02-08
Payer: COMMERCIAL

## 2024-02-09 NOTE — TELEPHONE ENCOUNTER
Pt called requesting an appt with PCP, told pt she was OOO till the 26th. Pt was heard coughing over the phone. Asked if she would like an appt with a covering provider, pt said yes. Soonest opening is 02/12/24 with Carmen. Pt is scheduled for that day. Advise pt to go to he nearest UC or ER if any SOB or cough gets worse. Pt understood and had no further questions. Pt stated she can wait till her appt.  
What Type Of Note Output Would You Prefer (Optional)?: Standard Output
Hpi Title: Evaluation of Skin Lesions
How Severe Are Your Spot(S)?: mild
Have Your Spot(S) Been Treated In The Past?: has not been treated
Additional History: Patient has a list of concerning spots.

## 2024-02-12 ENCOUNTER — OFFICE VISIT (OUTPATIENT)
Dept: MEDICAL GROUP | Facility: IMAGING CENTER | Age: 54
End: 2024-02-12
Payer: COMMERCIAL

## 2024-02-12 VITALS
SYSTOLIC BLOOD PRESSURE: 118 MMHG | HEIGHT: 64 IN | HEART RATE: 74 BPM | OXYGEN SATURATION: 97 % | BODY MASS INDEX: 33.97 KG/M2 | TEMPERATURE: 96.9 F | RESPIRATION RATE: 16 BRPM | WEIGHT: 199 LBS | DIASTOLIC BLOOD PRESSURE: 70 MMHG

## 2024-02-12 DIAGNOSIS — R05.2 SUBACUTE COUGH: ICD-10-CM

## 2024-02-12 LAB
FLUAV RNA SPEC QL NAA+PROBE: NEGATIVE
FLUBV RNA SPEC QL NAA+PROBE: NEGATIVE
RSV RNA SPEC QL NAA+PROBE: NEGATIVE
S PYO DNA SPEC NAA+PROBE: NOT DETECTED
SARS-COV-2 RNA RESP QL NAA+PROBE: NEGATIVE

## 2024-02-12 PROCEDURE — 3074F SYST BP LT 130 MM HG: CPT | Performed by: STUDENT IN AN ORGANIZED HEALTH CARE EDUCATION/TRAINING PROGRAM

## 2024-02-12 PROCEDURE — 87651 STREP A DNA AMP PROBE: CPT | Performed by: STUDENT IN AN ORGANIZED HEALTH CARE EDUCATION/TRAINING PROGRAM

## 2024-02-12 PROCEDURE — 3078F DIAST BP <80 MM HG: CPT | Performed by: STUDENT IN AN ORGANIZED HEALTH CARE EDUCATION/TRAINING PROGRAM

## 2024-02-12 PROCEDURE — 99213 OFFICE O/P EST LOW 20 MIN: CPT | Performed by: STUDENT IN AN ORGANIZED HEALTH CARE EDUCATION/TRAINING PROGRAM

## 2024-02-12 PROCEDURE — 0241U POCT CEPHEID COV-2, FLU A/B, RSV - PCR: CPT | Performed by: STUDENT IN AN ORGANIZED HEALTH CARE EDUCATION/TRAINING PROGRAM

## 2024-02-12 RX ORDER — BENZONATATE 100 MG/1
100 CAPSULE ORAL 3 TIMES DAILY PRN
Qty: 21 CAPSULE | Refills: 0 | Status: SHIPPED | OUTPATIENT
Start: 2024-02-12 | End: 2024-03-21

## 2024-02-12 RX ORDER — METHYLPREDNISOLONE 4 MG/1
TABLET ORAL
Qty: 21 TABLET | Refills: 0 | Status: SHIPPED | OUTPATIENT
Start: 2024-02-12 | End: 2024-03-21

## 2024-02-12 ASSESSMENT — PATIENT HEALTH QUESTIONNAIRE - PHQ9: CLINICAL INTERPRETATION OF PHQ2 SCORE: 0

## 2024-02-12 ASSESSMENT — FIBROSIS 4 INDEX: FIB4 SCORE: 0.73

## 2024-02-12 NOTE — PROGRESS NOTES
"Subjective:     CC:   Chief Complaint   Patient presents with    Cough     1 months, albuterol is not helping, weakness    Nausea       HPI:   Gely Cottrell, pleasant, 53 y.o., female is new to me and presents today to discuss:     Cough: started one month ago, has been worsening, associated with vomiting. Talking makes it worse. She has tried several OTC cough medications with no benefit. She took Amoxicillin x 1 week given by a family member 3 weeks ago. Has pain in forehead and tinnitus.  Feels like the back of her throat is swollen. Denies fever, chills, SOB, and CP. States she has history of Asthma and uses Albuterol as needed.     ROS:  See HPI    Medications, allergies, past medical history, family history, surgical history, and social history documented in chart and reviewed by me.       Objective:   Exam:  /70 (BP Location: Right arm, Patient Position: Sitting, BP Cuff Size: Adult)   Pulse 74   Temp 36.1 °C (96.9 °F) (Temporal)   Resp 16   Ht 1.626 m (5' 4\")   Wt 90.3 kg (199 lb)   SpO2 97%   BMI 34.16 kg/m²      Physical Exam  Constitutional:       General: She is not in acute distress.     Appearance: Normal appearance.   HENT:      Head: Normocephalic and atraumatic.   Eyes:      General: No scleral icterus.  Neck:      Thyroid: No thyroid mass or thyromegaly.   Cardiovascular:      Rate and Rhythm: Normal rate and regular rhythm.      Heart sounds: Normal heart sounds. No murmur heard.  Pulmonary:      Effort: Pulmonary effort is normal.      Breath sounds: Normal breath sounds. No wheezing.   Abdominal:      General: Bowel sounds are normal. There is no distension.      Palpations: Abdomen is soft. There is no mass.      Tenderness: There is no abdominal tenderness.   Musculoskeletal:         General: No swelling. Normal range of motion.      Cervical back: Neck supple.   Skin:     General: Skin is warm and dry.   Neurological:      General: No focal deficit present.      Mental " Status: She is alert and oriented to person, place, and time.      Gait: Gait normal.   Psychiatric:         Mood and Affect: Mood normal.         Behavior: Behavior normal.         Thought Content: Thought content normal.         Judgment: Judgment normal.              Assessment & Plan:     1. Subacute cough  Cough started one  month ago and has been worsening.  Patient took amoxicillin x 1 week, she obtained amoxicillin from a family member.  Heart and lung examination are normal.  Vital signs stable.  Point-of-care COVID, strep A, flu A and B,  and RSV are negative.  Discussed conservative treatment.  Sent prescription for Medrol Dosepak and Tessalon perles to help with symptoms.  Advised patient to avoid taking antibiotics without a prescription. Informed pt to return to clinic if symptoms do not resolve or worsen.  Strict ED precautions given.  Letter to be excused from work for the next 2 days given per patient's request.  Patient agreed with treatment plan and verbalized understanding.  - methylPREDNISolone (MEDROL DOSEPAK) 4 MG Tablet Therapy Pack; As directed on the packaging label.  Dispense: 21 Tablet; Refill: 0  - POCT Cepheid Group A Strep - PCR  - POCT Cepheid CoV-2, Flu A/B, RSV - PCR  - benzonatate (TESSALON) 100 MG Cap; Take 1 Capsule by mouth 3 times a day as needed for Cough.  Dispense: 21 Capsule; Refill: 0             Return if symptoms worsen or fail to improve.     Please note that this dictation was created using voice recognition software. I have made every reasonable attempt to correct obvious errors, but I expect that there are errors of grammar and possibly content that I did not discover before finalizing the note.    Carmen Saunders PA-C  [facility]

## 2024-02-12 NOTE — LETTER
[facility] [address]  [place] [id number]  [place] [address]     February 12, 2024    Patient: Gely Cottrell   YOB: 1970   Date of Visit: 2/12/2024       To Whom It May Concern:    Gely Cottrell was seen and treated in our department on 2/12/2024. Due to her symptoms, she needs to be off from work until 2/14/24. She may return  on 2/15/24.  Let me know if you have further questions or need additional information.    Sincerely,     Carmen Saunders P.A.-C.

## 2024-02-12 NOTE — PATIENT INSTRUCTIONS
Thank you for choosing [facility]. It was a pleasure meeting you today.     Take care!  Carmen [facility]bar[facility]

## 2024-02-21 ENCOUNTER — OFFICE VISIT (OUTPATIENT)
Dept: MEDICAL GROUP | Facility: IMAGING CENTER | Age: 54
End: 2024-02-21
Payer: COMMERCIAL

## 2024-02-21 VITALS
DIASTOLIC BLOOD PRESSURE: 82 MMHG | TEMPERATURE: 98 F | HEART RATE: 103 BPM | SYSTOLIC BLOOD PRESSURE: 124 MMHG | HEIGHT: 64 IN | OXYGEN SATURATION: 98 % | BODY MASS INDEX: 29.53 KG/M2 | WEIGHT: 173 LBS | RESPIRATION RATE: 17 BRPM

## 2024-02-21 DIAGNOSIS — R05.2 SUBACUTE COUGH: ICD-10-CM

## 2024-02-21 PROCEDURE — 1126F AMNT PAIN NOTED NONE PRSNT: CPT | Performed by: STUDENT IN AN ORGANIZED HEALTH CARE EDUCATION/TRAINING PROGRAM

## 2024-02-21 PROCEDURE — 3079F DIAST BP 80-89 MM HG: CPT | Performed by: STUDENT IN AN ORGANIZED HEALTH CARE EDUCATION/TRAINING PROGRAM

## 2024-02-21 PROCEDURE — 99213 OFFICE O/P EST LOW 20 MIN: CPT | Performed by: STUDENT IN AN ORGANIZED HEALTH CARE EDUCATION/TRAINING PROGRAM

## 2024-02-21 PROCEDURE — 3074F SYST BP LT 130 MM HG: CPT | Performed by: STUDENT IN AN ORGANIZED HEALTH CARE EDUCATION/TRAINING PROGRAM

## 2024-02-21 RX ORDER — AMOXICILLIN AND CLAVULANATE POTASSIUM 875; 125 MG/1; MG/1
1 TABLET, FILM COATED ORAL 2 TIMES DAILY
Qty: 14 TABLET | Refills: 0 | Status: SHIPPED | OUTPATIENT
Start: 2024-02-21 | End: 2024-03-21

## 2024-02-21 RX ORDER — CODEINE PHOSPHATE AND GUAIFENESIN 10; 100 MG/5ML; MG/5ML
5 SOLUTION ORAL EVERY 4 HOURS PRN
Qty: 180 ML | Refills: 0 | Status: SHIPPED | OUTPATIENT
Start: 2024-02-21 | End: 2024-02-26

## 2024-02-21 ASSESSMENT — FIBROSIS 4 INDEX: FIB4 SCORE: 0.73

## 2024-02-21 ASSESSMENT — PAIN SCALES - GENERAL: PAINLEVEL: NO PAIN

## 2024-02-21 NOTE — PROGRESS NOTES
"Subjective:     CC:   Chief Complaint   Patient presents with    Follow-Up     On cough, with abdominal pain and back pain.     Fever     With mouth blisters, better now. OTC tylenol for fever        HPI:   Gely Cottrellraheem, 53 y.o., female,  presents today to discuss:     Cough f/u: pt was seen on 2/12 for cough. COVID, strep, influenza, and RSV were negative.  Medrol dosepak and tessalon perles were given. States she completed Medrol Dosepak.  Cough has worsened.  Cough is interfering with her sleep, breathing, and with her ability to speak in full sentences.  She has also been experiencing vomiting, abdominal pain, and back pain due to coughing.  Experienced some fevers and blisters on her mouth a few days ago.  She is requesting to be off from work for the rest of the week.    ROS:  See HPI    Medications, allergies, past medical history, family history, surgical history, and social history documented in chart and reviewed by me.       Objective:   Exam:  /82 (BP Location: Left arm, Patient Position: Sitting, BP Cuff Size: Adult)   Pulse (!) 103   Temp 36.7 °C (98 °F) (Temporal)   Resp 17   Ht 1.626 m (5' 4\")   Wt 78.5 kg (173 lb)   SpO2 98%   BMI 29.70 kg/m²      Physical Exam  Vitals reviewed.   Constitutional:       General: She is not in acute distress.     Appearance: Normal appearance. She is ill-appearing. She is not toxic-appearing.   HENT:      Head: Normocephalic and atraumatic.      Right Ear: Tympanic membrane, ear canal and external ear normal.      Left Ear: Tympanic membrane, ear canal and external ear normal.      Nose: No congestion or rhinorrhea.      Mouth/Throat:      Mouth: Mucous membranes are moist.      Pharynx: Oropharynx is clear. No oropharyngeal exudate or posterior oropharyngeal erythema.      Tonsils: No tonsillar exudate.   Eyes:      General: No scleral icterus.        Right eye: No discharge.         Left eye: No discharge.      Extraocular Movements: " Extraocular movements intact.      Conjunctiva/sclera: Conjunctivae normal.      Pupils: Pupils are equal, round, and reactive to light.   Neck:      Thyroid: No thyroid mass or thyromegaly.   Cardiovascular:      Rate and Rhythm: Normal rate and regular rhythm.      Pulses: Normal pulses.      Heart sounds: Normal heart sounds. No murmur heard.  Pulmonary:      Effort: Pulmonary effort is normal. No respiratory distress.      Breath sounds: Normal breath sounds. No wheezing.   Musculoskeletal:         General: Normal range of motion.      Cervical back: Normal range of motion and neck supple. No tenderness.   Lymphadenopathy:      Cervical: No cervical adenopathy.   Skin:     General: Skin is warm and dry.      Coloration: Skin is not jaundiced.   Neurological:      General: No focal deficit present.      Mental Status: She is alert and oriented to person, place, and time.      Gait: Gait normal.   Psychiatric:         Mood and Affect: Mood normal.         Behavior: Behavior normal.         Thought Content: Thought content normal.         Judgment: Judgment normal.              Assessment & Plan:       1. Subacute cough  Persistent and worsening. Pt was having difficulty to speak due to the cough.  She appears to be in distress from constant coughing.  She took Medrol Dosepak, Tessalon Perles, and several over-the-counter cough suppressants with no benefit.  Heart and lung examination are normal.  Vital signs are stable.  At this point, she will benefit from an antibiotic and stronger cough suppressant.  Will trial Augmentin 875-125 mg twice daily x 7 days and guaifenesin-codeine 5 mL every 4 hours as needed for cough.  Chest x-ray ordered for further evaluation.  Advised patient not to drink alcohol or drive while taking guaifenesin -codeine.  Strict ED precautions given.  Advised patient to return to clinic symptoms do not improve or worsen.  Will follow-up after completing chest x-ray.  Letter to be excused from  work for the rest of the week and given as requested by patient  - guaifenesin-codeine (ROBITUSSIN AC) Solution oral solution; Take 5 mL by mouth every four hours as needed for Cough (as needed for cough) for up to 5 days.  Dispense: 180 mL; Refill: 0  - DX-CHEST-2 VIEWS; Future  - amoxicillin-clavulanate (AUGMENTIN) 875-125 MG Tab; Take 1 Tablet by mouth 2 times a day.  Dispense: 14 Tablet; Refill: 0       Diagnosis and treatment plan explained to pt. Counseled pt on new medication(s) and potential side effects. Pt agreed with treatment plan and verbalized understanding.     Return if symptoms worsen or fail to improve.     Please note that this dictation was created using voice recognition software. I have made every reasonable attempt to correct obvious errors, but I expect that there are errors of grammar and possibly content that I did not discover before finalizing the note.    Carmen Saunders PA-C  [facility]

## 2024-02-21 NOTE — LETTER
[facility] [address]  [facility]  [address]  [place] [address]     February 21, 2024    Patient: Gely Cottrell   YOB: 1970   Date of Visit: 2/21/2024       To Whom It May Concern:    Gely Cottrell was seen and treated in our department on 2/21/2024. She may return to work on 2/26/24. If you have questions or need more information do not hesitate in contacting me.     Sincerely,     Carmen Saunders P.A.-C.

## 2024-03-21 ENCOUNTER — OFFICE VISIT (OUTPATIENT)
Dept: MEDICAL GROUP | Facility: IMAGING CENTER | Age: 54
End: 2024-03-21
Payer: COMMERCIAL

## 2024-03-21 VITALS
HEIGHT: 64 IN | BODY MASS INDEX: 30.56 KG/M2 | WEIGHT: 179 LBS | TEMPERATURE: 97.9 F | SYSTOLIC BLOOD PRESSURE: 132 MMHG | DIASTOLIC BLOOD PRESSURE: 78 MMHG | RESPIRATION RATE: 14 BRPM | OXYGEN SATURATION: 95 % | HEART RATE: 87 BPM

## 2024-03-21 DIAGNOSIS — R73.03 PREDIABETES: ICD-10-CM

## 2024-03-21 DIAGNOSIS — Z01.82 ENCOUNTER FOR ALLERGY TESTING: ICD-10-CM

## 2024-03-21 DIAGNOSIS — E78.00 HYPERCHOLESTEROLEMIA: ICD-10-CM

## 2024-03-21 DIAGNOSIS — Z78.0 MENOPAUSE: ICD-10-CM

## 2024-03-21 DIAGNOSIS — G89.29 CHRONIC PAIN OF MULTIPLE JOINTS: ICD-10-CM

## 2024-03-21 DIAGNOSIS — R53.82 CHRONIC FATIGUE: ICD-10-CM

## 2024-03-21 DIAGNOSIS — E66.9 OBESITY (BMI 30.0-34.9): ICD-10-CM

## 2024-03-21 DIAGNOSIS — M25.40 SWELLING OF MULTIPLE JOINTS: ICD-10-CM

## 2024-03-21 DIAGNOSIS — R63.5 WEIGHT GAIN: ICD-10-CM

## 2024-03-21 DIAGNOSIS — M25.50 CHRONIC PAIN OF MULTIPLE JOINTS: ICD-10-CM

## 2024-03-21 PROBLEM — E66.811 OBESITY (BMI 30.0-34.9): Status: ACTIVE | Noted: 2024-03-21

## 2024-03-21 PROCEDURE — 99214 OFFICE O/P EST MOD 30 MIN: CPT

## 2024-03-21 PROCEDURE — 3075F SYST BP GE 130 - 139MM HG: CPT

## 2024-03-21 PROCEDURE — 3078F DIAST BP <80 MM HG: CPT

## 2024-03-21 ASSESSMENT — FIBROSIS 4 INDEX: FIB4 SCORE: 0.73

## 2024-03-21 NOTE — PROGRESS NOTES
Subjective:     CC:   Chief Complaint   Patient presents with    Weight Check     Joint pain and swelling, multiple sites    Requesting Labs       HPI:   Gely presents today to discuss:    Chronic fatigue  Chronic pain of multiple joints  Swelling of multiple joints  Chronic and ongoing condition for over a year. Etiology unknown. Her symptoms are not improving.   She is requesting lab work.    Obesity BMI 30-34.9  Weight gain  Patient reports weight gain despite healthy diet. She admits going on  walks daily but not regular exercise.   She has gone to [facility] for bariatric surgery consultation; however, she wanted a second opinion and other alternatives for weight loss.    Menopause  Pt admits amenorrhea for 1 year. She is not established with gynecology. She is having multiple different symptoms mentioned above.          Past Medical History:   Diagnosis Date    Anemia 2002    Chest pain, rule out acute myocardial infarction 8/27/2015    GERD (gastroesophageal reflux disease) 8/29/2011    RUQ abdominal pain 8/29/2011     Family History   Problem Relation Age of Onset    Diabetes Mother     Hypertension Mother     Hyperlipidemia Mother     Stroke Mother     Heart Disease Mother 60    Hypertension Father     Cancer Paternal Uncle         several uncles iwth stomach cancer of some kind     Past Surgical History:   Procedure Laterality Date    APPENDECTOMY      CHOLECYSTECTOMY      LUMPECTOMY      2003    PRIMARY C SECTION      repeat c section time 2    TUBAL COAGULATION LAPAROSCOPIC BILATERAL       Social History     Tobacco Use    Smoking status: Never    Smokeless tobacco: Never    Tobacco comments:     very occasional   Vaping Use    Vaping Use: Never used   Substance Use Topics    Alcohol use: Yes     Comment: socially    Drug use: No     Social History     Social History Narrative    Not on file     Current Outpatient Medications Ordered in Epic   Medication Sig Dispense Refill    Fexofenadine HCl (ALLEGRA  "PO) Take  by mouth.      albuterol 108 (90 Base) MCG/ACT Aero Soln inhalation aerosol Inhale 2 Puffs every four hours as needed for Shortness of Breath. (Patient not taking: Reported on 2/21/2024) 1 Each 1     No current [facility]-ordered facility-administered medications on file.     Seasonal    ROS: see hpi  Gen: no fevers/chills  Pulm: no sob, no cough  CV: no chest pain, no palpitations, no edema  GI: no nausea/vomiting, no diarrhea  Skin: no rash    Objective:   Exam:  /78 (BP Location: Left arm, Patient Position: Sitting, BP Cuff Size: Adult)   Pulse 87   Temp 36.6 °C (97.9 °F) (Temporal)   Resp 14   Ht 1.626 m (5' 4\")   Wt 81.2 kg (179 lb)   SpO2 95%   BMI 30.73 kg/m²    Body mass index is 30.73 kg/m².    Gen: Alert and oriented, No apparent distress.  HEENT: Head atraumatic, normocephalic. Pupils equal and round.  Neck: Neck is supple without lymphadenopathy.   Lungs: Normal effort, CTA bilaterally, no wheezes, rhonchi, or rales  CV: Regular rate and rhythm. No murmurs, rubs, or gallops.  ABD: +BS. Non-tender, non-distended. No rebound, rigidity, or guarding.  Ext: No clubbing, cyanosis, bilateral ankle edema    Assessment & Plan:     53 y.o. female with the following -     1. Chronic fatigue  2. Swelling of multiple joints  3. Chronic pain of multiple joints  Chronic and ongoing condition. Etiology unknown. Will order labs to r/o autoimmune disorder, metabolic disorder, and thyroid disorder.    - HEMOGLOBIN A1C; Future  - GILMER REFLEXIVE PROFILE; Future  - CBC WITHOUT DIFFERENTIAL; Future  - Comp Metabolic Panel; Future  - TSH WITH REFLEX TO FT4; Future    4. Obesity (BMI 30.0-34.9)  5. Weight gain  Discussed lifestyle and dietary changes such as low-carb diet, high in vegetables and fresh fruits.  Encouraged to increase water intake.  Regular physical exercise at least 30 minutes/day 5 days a week. Weight reduction if applicable (aim for 5% to 10% body weight increments).   Discussed weight loss is a " comprehensive lifestyle intervention: a combination of diet, exercise, behavioral modification, and pharmacotherapy.   Will order labs and f/u to discuss starting drug therapy for weight loss.     - Lipid Profile; Future  - HEMOGLOBIN A1C; Future  - CBC WITHOUT DIFFERENTIAL; Future  - Comp Metabolic Panel; Future  - TSH WITH REFLEX TO FT4; Future    6. Menopause  New issue. Pt's symptoms are likely r/t to menopause. Pt may benefit with HRT. Will refer to gynecology.     - Referral to Gynecology    7. Hypercholesterolemia    - Lipid Profile; Future    8. Prediabetes    - HEMOGLOBIN A1C; Future  - INSULIN FASTING; Future    9. Encounter for allergy testing  Pt requesting referral to allergist for allergy testing.     - Referral to Allergy    Medical Decision Making/Course:  In the course of preparing for this visit with review of the pertinent past medical history, recent and past clinic visits, current medications, and performing chart, immunization, medical history and medication reconciliation, and in the further course of obtaining the current history pertinent to the clinic visit today, performing an exam and evaluation, ordering and independently evaluating labs, tests, and/or procedures, prescribing any recommended new medications as noted above, providing any pertinent counseling and education and recommending further coordination of care. This was discussed with patient in a shared-decision making conversation, and they understand and agreed with plan of care.     Return if symptoms worsen or fail to improve.    ROCÍO Valenzuela.   [facility]    Please note that this dictation was created using voice recognition software. I have made every reasonable attempt to correct obvious errors, but I expect that there are errors of grammar and possibly content that I did not discover before finalizing the note.

## 2024-03-22 ENCOUNTER — HOSPITAL ENCOUNTER (OUTPATIENT)
Dept: LAB | Facility: MEDICAL CENTER | Age: 54
End: 2024-03-22
Payer: COMMERCIAL

## 2024-03-22 DIAGNOSIS — E66.9 OBESITY (BMI 30.0-34.9): ICD-10-CM

## 2024-03-22 DIAGNOSIS — R53.82 CHRONIC FATIGUE: ICD-10-CM

## 2024-03-22 DIAGNOSIS — G89.29 CHRONIC PAIN OF MULTIPLE JOINTS: ICD-10-CM

## 2024-03-22 DIAGNOSIS — M25.50 CHRONIC PAIN OF MULTIPLE JOINTS: ICD-10-CM

## 2024-03-22 DIAGNOSIS — R73.03 PREDIABETES: ICD-10-CM

## 2024-03-22 DIAGNOSIS — M25.40 SWELLING OF MULTIPLE JOINTS: ICD-10-CM

## 2024-03-22 DIAGNOSIS — E78.00 HYPERCHOLESTEROLEMIA: ICD-10-CM

## 2024-03-22 LAB
ALBUMIN SERPL BCP-MCNC: 4.4 G/DL (ref 3.2–4.9)
ALBUMIN/GLOB SERPL: 1.6 G/DL
ALP SERPL-CCNC: 102 U/L (ref 30–99)
ALT SERPL-CCNC: 61 U/L (ref 2–50)
ANION GAP SERPL CALC-SCNC: 15 MMOL/L (ref 7–16)
AST SERPL-CCNC: 29 U/L (ref 12–45)
BILIRUB SERPL-MCNC: 0.4 MG/DL (ref 0.1–1.5)
BUN SERPL-MCNC: 20 MG/DL (ref 8–22)
CALCIUM ALBUM COR SERPL-MCNC: 9.2 MG/DL (ref 8.5–10.5)
CALCIUM SERPL-MCNC: 9.5 MG/DL (ref 8.5–10.5)
CHLORIDE SERPL-SCNC: 105 MMOL/L (ref 96–112)
CHOLEST SERPL-MCNC: 252 MG/DL (ref 100–199)
CO2 SERPL-SCNC: 20 MMOL/L (ref 20–33)
CREAT SERPL-MCNC: 0.75 MG/DL (ref 0.5–1.4)
ERYTHROCYTE [DISTWIDTH] IN BLOOD BY AUTOMATED COUNT: 45.3 FL (ref 35.9–50)
EST. AVERAGE GLUCOSE BLD GHB EST-MCNC: 123 MG/DL
FASTING STATUS PATIENT QL REPORTED: NORMAL
GFR SERPLBLD CREATININE-BSD FMLA CKD-EPI: 95 ML/MIN/1.73 M 2
GLOBULIN SER CALC-MCNC: 2.8 G/DL (ref 1.9–3.5)
GLUCOSE SERPL-MCNC: 102 MG/DL (ref 65–99)
HBA1C MFR BLD: 5.9 % (ref 4–5.6)
HCT VFR BLD AUTO: 44.4 % (ref 37–47)
HDLC SERPL-MCNC: 54 MG/DL
HGB BLD-MCNC: 15.4 G/DL (ref 12–16)
LDLC SERPL CALC-MCNC: 170 MG/DL
MCH RBC QN AUTO: 31.4 PG (ref 27–33)
MCHC RBC AUTO-ENTMCNC: 34.7 G/DL (ref 32.2–35.5)
MCV RBC AUTO: 90.4 FL (ref 81.4–97.8)
PLATELET # BLD AUTO: 340 K/UL (ref 164–446)
PMV BLD AUTO: 9.6 FL (ref 9–12.9)
POTASSIUM SERPL-SCNC: 4.1 MMOL/L (ref 3.6–5.5)
PROT SERPL-MCNC: 7.2 G/DL (ref 6–8.2)
RBC # BLD AUTO: 4.91 M/UL (ref 4.2–5.4)
SODIUM SERPL-SCNC: 140 MMOL/L (ref 135–145)
TRIGL SERPL-MCNC: 139 MG/DL (ref 0–149)
TSH SERPL DL<=0.005 MIU/L-ACNC: 3.83 UIU/ML (ref 0.38–5.33)
WBC # BLD AUTO: 9.1 K/UL (ref 4.8–10.8)

## 2024-03-22 PROCEDURE — 85027 COMPLETE CBC AUTOMATED: CPT

## 2024-03-22 PROCEDURE — 36415 COLL VENOUS BLD VENIPUNCTURE: CPT

## 2024-03-22 PROCEDURE — 83525 ASSAY OF INSULIN: CPT

## 2024-03-22 PROCEDURE — 83036 HEMOGLOBIN GLYCOSYLATED A1C: CPT

## 2024-03-22 PROCEDURE — 80053 COMPREHEN METABOLIC PANEL: CPT

## 2024-03-22 PROCEDURE — 86038 ANTINUCLEAR ANTIBODIES: CPT

## 2024-03-22 PROCEDURE — 80061 LIPID PANEL: CPT

## 2024-03-22 PROCEDURE — 84443 ASSAY THYROID STIM HORMONE: CPT

## 2024-03-23 LAB
INSULIN P FAST SERPL-ACNC: 27 UIU/ML (ref 3–25)
NUCLEAR IGG SER QL IA: NORMAL

## 2024-03-27 ENCOUNTER — OFFICE VISIT (OUTPATIENT)
Dept: MEDICAL GROUP | Facility: IMAGING CENTER | Age: 54
End: 2024-03-27
Payer: COMMERCIAL

## 2024-03-27 VITALS
TEMPERATURE: 97.5 F | SYSTOLIC BLOOD PRESSURE: 114 MMHG | WEIGHT: 179 LBS | DIASTOLIC BLOOD PRESSURE: 76 MMHG | BODY MASS INDEX: 30.56 KG/M2 | RESPIRATION RATE: 16 BRPM | HEART RATE: 92 BPM | OXYGEN SATURATION: 95 % | HEIGHT: 64 IN

## 2024-03-27 DIAGNOSIS — E78.00 HYPERCHOLESTEROLEMIA: ICD-10-CM

## 2024-03-27 DIAGNOSIS — E88.810 METABOLIC SYNDROME: ICD-10-CM

## 2024-03-27 DIAGNOSIS — K76.0 HEPATIC STEATOSIS: ICD-10-CM

## 2024-03-27 DIAGNOSIS — E88.819 INSULIN RESISTANCE: ICD-10-CM

## 2024-03-27 DIAGNOSIS — R74.8 ELEVATED LIVER ENZYMES: ICD-10-CM

## 2024-03-27 DIAGNOSIS — R73.03 PREDIABETES: ICD-10-CM

## 2024-03-27 DIAGNOSIS — Z71.3 WEIGHT LOSS COUNSELING, ENCOUNTER FOR: ICD-10-CM

## 2024-03-27 DIAGNOSIS — E66.9 OBESITY (BMI 30-39.9): ICD-10-CM

## 2024-03-27 ASSESSMENT — FIBROSIS 4 INDEX: FIB4 SCORE: 0.57

## 2024-03-27 ASSESSMENT — PAIN SCALES - GENERAL: PAINLEVEL: 5=MODERATE PAIN

## 2024-03-27 NOTE — PROGRESS NOTES
Subjective:     CC:   Chief Complaint   Patient presents with    Follow-Up     On lab results from 03/22/24       HPI:   Gely presents today to discuss:    Obesity BMI 30-34.9   Prediabetes  Insulin resistance  Worsening condition.   Pt continues to gain weight and has difficulty with weight loss.     Latest Reference Range & Units 03/22/24 09:58   Glycohemoglobin 4.0 - 5.6 % 5.9 (H)   (H): Data is abnormally high   Latest Reference Range & Units 03/22/24 09:57   Insulin Fasting 3 - 25 uIU/mL 27 (H)   (H): Data is abnormally high    Pure hypercholesterolemia  Established and worsening condition.  The 10-year ASCVD risk score (Monika DK, et al., 2019) is: 1.6%  Patient denies blurred blurred, severe headaches, chest pain, chest pressure, dizziness, palpitations, syncope, orthopnea, dyspnea or exertion, or leg swelling.     Latest Reference Range & Units 03/22/24 09:58   Cholesterol,Tot 100 - 199 mg/dL 252 (H)   Triglycerides 0 - 149 mg/dL 139   HDL >=40 mg/dL 54   LDL <100 mg/dL 170 (H)   (H): Data is abnormally high    Elevated liver enzymes  New finding. Patient denies muscle pain, lethargy, loss of appetite, abdominal pain, dark-colored urine, or yellowing of skin or eyes.     Latest Reference Range & Units 03/22/24 09:58   AST(SGOT) 12 - 45 U/L 29   ALT(SGPT) 2 - 50 U/L 61 (H)   Alkaline Phosphatase 30 - 99 U/L 102 (H)   (H): Data is abnormally high  Past Medical History:   Diagnosis Date    Anemia 2002    Chest pain, rule out acute myocardial infarction 8/27/2015    GERD (gastroesophageal reflux disease) 8/29/2011    RUQ abdominal pain 8/29/2011     Family History   Problem Relation Age of Onset    Diabetes Mother     Hypertension Mother     Hyperlipidemia Mother     Stroke Mother     Heart Disease Mother 60    Hypertension Father     Cancer Paternal Uncle         several uncles iwth stomach cancer of some kind     Past Surgical History:   Procedure Laterality Date    APPENDECTOMY      CHOLECYSTECTOMY       "LUMPECTOMY      2003    PRIMARY C SECTION      repeat c section time 2    TUBAL COAGULATION LAPAROSCOPIC BILATERAL       Social History     Tobacco Use    Smoking status: Never    Smokeless tobacco: Never    Tobacco comments:     very occasional   Vaping Use    Vaping Use: Never used   Substance Use Topics    Alcohol use: Yes     Comment: socially    Drug use: No     Social History     Social History Narrative    Not on file     Current Outpatient Medications Ordered in Epic   Medication Sig Dispense Refill    Tirzepatide-Weight Management 2.5 MG/0.5ML Solution Auto-injector Inject 2.5 mg under the skin every 7 days. 6 mL 1    Fexofenadine HCl (ALLEGRA PO) Take  by mouth.      albuterol 108 (90 Base) MCG/ACT Aero Soln inhalation aerosol Inhale 2 Puffs every four hours as needed for Shortness of Breath. (Patient not taking: Reported on 2/21/2024) 1 Each 1     No current [facility]-ordered facility-administered medications on file.     Seasonal    ROS: see hpi  Gen: no fevers/chills  Pulm: no sob, no cough  CV: no chest pain, no palpitations, no edema  GI: no nausea/vomiting, no diarrhea  Skin: no rash    Objective:   Exam:  /76 (BP Location: Left arm, Patient Position: Sitting, BP Cuff Size: Adult)   Pulse 92   Temp 36.4 °C (97.5 °F) (Temporal)   Resp 16   Ht 1.626 m (5' 4\")   Wt 81.2 kg (179 lb)   SpO2 95%   BMI 30.73 kg/m²    Body mass index is 30.73 kg/m².    Gen: Alert and oriented, No apparent distress.  HEENT: Head atraumatic, normocephalic. Pupils equal and round.  Neck: Neck is supple without lymphadenopathy.   Lungs: Normal effort, CTA bilaterally, no wheezes, rhonchi, or rales  CV: Regular rate and rhythm. No murmurs, rubs, or gallops.  ABD: +BS. Non-tender, non-distended. No rebound, rigidity, or guarding.  Ext: No clubbing, cyanosis, edema.    Assessment & Plan:     53 y.o. female with the following -     1. Weight loss counseling, encounter for  2. Obesity (BMI 30-39.9)  3. Prediabetes  4. Insulin " resistance  Uncontrolled condition. Patient counseled on lifestyle modification that include healthy diet, increase physical activity, and maintain normal BMI to help reduce her risk of progression to diabetes and ASCVD.  Discussed weight loss is a comprehensive lifestyle intervention: a combination of diet, exercise, behavioral modification, and pharmacotherapy. Patient would like to start on pharmacotherapy. Drug therapy options such as GLP-1 receptor agonists, phentermine, topiramate, buproprion discussed.  Patient will benefit from Tirzepatide. Pt agreeable to this.   Discussed in detail Tirzepatide's mechanism of action, benefits, side effects such as nausea, vomiting, diarrhea, stomach pain, and constipation, and how to administer.   Discussed in detail and counseled patient on medication's potential risk for thyroid tumors and thyroid cancers.  Instructed patient to monitor for signs of thyroid tumor such as mass in neck, difficulty swallowing, persistent hoarseness, and dyspnea.  Advised patient to drink plenty of water while on this medication and eat small portions of food. Avoid drinking alcohol while on this medication.   Encouraged to start exercise program with strength training to prevent muscle loss while on this medication.  Will follow-up in 4 weeks for med check.    - Tirzepatide-Weight Management 2.5 MG/0.5ML Solution Auto-injector; Inject 2.5 mg under the skin every 7 days.  Dispense: 6 mL; Refill: 1  - HEMOGLOBIN A1C; Future    5. Hypercholesterolemia  Chronic, worsening condition.  Discussed The 10-year ASCVD risk score (Monika BERMUDEZ, et al., 2019) is: 1.6%  Recommend healthy lifestyle changes that include diet low in saturated fat, eat lean protein, diet high in fresh vegetables, fruits, and fiber; exercise 30 min per day 5 times a week, avoid alcohol, stop smoking, practice stress reduction techniques, and good sleep hygiene. I also recommend start taking omega 3 fatty acid daily  supplementation.    Patient will treat with his appetite for weight loss which will help with cholesterol management.  Will repeat lipid panel in 3 months.  If remains elevated, will discuss starting statins.  Patient agreeable to this.    - Tirzepatide-Weight Management 2.5 MG/0.5ML Solution Auto-injector; Inject 2.5 mg under the skin every 7 days.  Dispense: 6 mL; Refill: 1  - Lipid Profile; Future    7. Metabolic syndrome  8. Elevated liver enzymes  9.  Hepatic steatosis  Established condition.  Recommend healthy lifestyle modification mentioned above.  Will start his appetite for weight loss.  Will repeat labs in 3 months.  Recommend to follow-up with GI.    - Tirzepatide-Weight Management 2.5 MG/0.5ML Solution Auto-injector; Inject 2.5 mg under the skin every 7 days.  Dispense: 6 mL; Refill: 1    Medical Decision Making/Course:  In the course of preparing for this visit with review of the pertinent past medical history, recent and past clinic visits, current medications, and performing chart, immunization, medical history and medication reconciliation, and in the further course of obtaining the current history pertinent to the clinic visit today, performing an exam and evaluation, ordering and independently evaluating labs, tests, and/or procedures, prescribing any recommended new medications as noted above, providing any pertinent counseling and education and recommending further coordination of care. This was discussed with patient in a shared-decision making conversation, and they understand and agreed with plan of care.     Return in about 4 weeks (around 4/24/2024), or if symptoms worsen or fail to improve, for med check, weight check.    ROCÍO Valenzuela.   [facility]    Please note that this dictation was created using voice recognition software. I have made every reasonable attempt to correct obvious errors, but I expect that there are errors of grammar and possibly content that I  did not discover before finalizing the note.

## 2024-03-28 ENCOUNTER — TELEPHONE (OUTPATIENT)
Dept: MEDICAL GROUP | Facility: IMAGING CENTER | Age: 54
End: 2024-03-28
Payer: COMMERCIAL

## 2024-03-28 NOTE — TELEPHONE ENCOUNTER
MEDICATION PRIOR AUTHORIZATION NEEDED:    1. Name of Medication: Tirzepatide     2. Requested By (Name of Pharmacy): [facility]      3. Is insurance on file current? Yes     4. What is the name & phone number of the 3rd party payor? Gely Cottrell     PA submitted through [facility]

## 2024-04-17 DIAGNOSIS — E66.9 OBESITY (BMI 30-39.9): ICD-10-CM

## 2024-04-17 DIAGNOSIS — E88.819 INSULIN RESISTANCE: ICD-10-CM

## 2024-04-17 DIAGNOSIS — E78.5 HYPERLIPIDEMIA, UNSPECIFIED HYPERLIPIDEMIA TYPE: ICD-10-CM

## 2024-04-17 DIAGNOSIS — R73.03 PREDIABETES: ICD-10-CM

## 2024-06-19 ENCOUNTER — OFFICE VISIT (OUTPATIENT)
Dept: MEDICAL GROUP | Facility: IMAGING CENTER | Age: 54
End: 2024-06-19
Payer: COMMERCIAL

## 2024-06-19 VITALS
HEART RATE: 77 BPM | RESPIRATION RATE: 14 BRPM | WEIGHT: 180 LBS | OXYGEN SATURATION: 97 % | BODY MASS INDEX: 30.73 KG/M2 | DIASTOLIC BLOOD PRESSURE: 70 MMHG | TEMPERATURE: 99 F | HEIGHT: 64 IN | SYSTOLIC BLOOD PRESSURE: 116 MMHG

## 2024-06-19 DIAGNOSIS — K76.0 NON-ALCOHOLIC FATTY LIVER DISEASE: ICD-10-CM

## 2024-06-19 DIAGNOSIS — R06.09 DYSPNEA ON EXERTION: ICD-10-CM

## 2024-06-19 DIAGNOSIS — E78.00 PURE HYPERCHOLESTEROLEMIA: ICD-10-CM

## 2024-06-19 DIAGNOSIS — E66.9 OBESITY (BMI 30.0-34.9): ICD-10-CM

## 2024-06-19 DIAGNOSIS — E88.810 METABOLIC SYNDROME: ICD-10-CM

## 2024-06-19 DIAGNOSIS — R73.03 PREDIABETES: ICD-10-CM

## 2024-06-19 DIAGNOSIS — M25.471 SWELLING OF BOTH ANKLES: ICD-10-CM

## 2024-06-19 DIAGNOSIS — M25.472 SWELLING OF BOTH ANKLES: ICD-10-CM

## 2024-06-19 LAB
POC HEMOGLOBIN: 5.8
POCT INT CON NEG: NEGATIVE
POCT INT CON POS: POSITIVE

## 2024-06-19 PROCEDURE — 85018 HEMOGLOBIN: CPT

## 2024-06-19 PROCEDURE — 3078F DIAST BP <80 MM HG: CPT

## 2024-06-19 PROCEDURE — 93000 ELECTROCARDIOGRAM COMPLETE: CPT

## 2024-06-19 PROCEDURE — 3074F SYST BP LT 130 MM HG: CPT

## 2024-06-19 PROCEDURE — 99214 OFFICE O/P EST MOD 30 MIN: CPT

## 2024-06-19 RX ORDER — ROSUVASTATIN CALCIUM 5 MG/1
5 TABLET, COATED ORAL EVERY EVENING
Qty: 30 TABLET | Refills: 11 | Status: SHIPPED | OUTPATIENT
Start: 2024-06-19

## 2024-06-19 ASSESSMENT — FIBROSIS 4 INDEX: FIB4 SCORE: 0.57

## 2024-06-19 NOTE — PROGRESS NOTES
Subjective:     CC:   Chief Complaint   Patient presents with    Shortness of Breath     X 2 mos   Pt report every week feels worst   Pt report when walking and upstairs to her room   Ankle swelling, both    Weight Check     Pt report weight gain        HPI:   Gely presents today to report short of breath with exertion and bilateral ankle swelling that has worsened in the past 2 months.  Her symptoms with wax and wane.  She reports onset of SOB started when she was diagnosed with bronchitis in February.  Since then, she continues to have intermittent episodes of shortness of breath.  However, ankle swelling is new.  Her symptoms worsened after regaining weight that she lost from semaglutide.   She was on semaglutide for 1 and half months.  While on medication, her chronic fatigue and joint pain improved.  She was exercising which resulted in weight loss.  However, she is not able to maintain cost of semaglutide since insurance is not covering her medications.    She continues to gain weight since being off semaglutide. She would like to restart semaglutide.   She has not f/u with gynecology to discuss management of menopause. Recommend to f/u with gynecology.  She denies palpitations, persistent chest pain, difficulty breathing, syncope.    Past Medical History:   Diagnosis Date    Anemia 2002    Chest pain, rule out acute myocardial infarction 8/27/2015    GERD (gastroesophageal reflux disease) 8/29/2011    RUQ abdominal pain 8/29/2011     Family History   Problem Relation Age of Onset    Diabetes Mother     Hypertension Mother     Hyperlipidemia Mother     Stroke Mother     Heart Disease Mother 60    Hypertension Father     Cancer Paternal Uncle         several uncles iwth stomach cancer of some kind     Past Surgical History:   Procedure Laterality Date    APPENDECTOMY      CHOLECYSTECTOMY      LUMPECTOMY      2003    PRIMARY C SECTION      repeat c section time 2    TUBAL COAGULATION LAPAROSCOPIC BILATERAL    "    Social History     Tobacco Use    Smoking status: Never    Smokeless tobacco: Never    Tobacco comments:     very occasional   Vaping Use    Vaping status: Never Used   Substance Use Topics    Alcohol use: Yes     Comment: socially    Drug use: No     Social History     Social History Narrative    Not on file     Current Outpatient Medications Ordered in Epic   Medication Sig Dispense Refill    rosuvastatin (CRESTOR) 5 MG Tab Take 1 Tablet by mouth every evening. 30 Tablet 11    Semaglutide (WEGOVY) 1 MG/0.5ML Solution Auto-injector Pen-injector Inject 0.5 mL under the skin every 7 days. 2 mL 3    Fexofenadine HCl (ALLEGRA PO) Take  by mouth.      albuterol 108 (90 Base) MCG/ACT Aero Soln inhalation aerosol Inhale 2 Puffs every four hours as needed for Shortness of Breath. (Patient not taking: Reported on 2/21/2024) 1 Each 1     No current [facility]-ordered facility-administered medications on file.     Seasonal    ROS: see hpi  Gen: no fevers/chills  Pulm: no sob, no cough  CV: no chest pain, no palpitations, yes edema  GI: no nausea/vomiting, no diarrhea  Skin: no rash    Objective:   Exam:  /70 (BP Location: Left arm, Patient Position: Sitting, BP Cuff Size: Adult)   Pulse 77   Temp 37.2 °C (99 °F) (Temporal)   Resp 14   Ht 1.626 m (5' 4\")   Wt 81.6 kg (180 lb)   SpO2 97%   BMI 30.90 kg/m²    Body mass index is 30.9 kg/m².    Gen: Alert and oriented, No apparent distress.  HEENT: Head atraumatic, normocephalic. Pupils equal and round.  Neck: Neck is supple without lymphadenopathy.   Lungs: Normal effort, CTA bilaterally, no wheezes, rhonchi, or rales  CV: Regular rate and rhythm. No murmurs, rubs, or gallops.  ABD: +BS. Non-tender, non-distended. No rebound, rigidity, or guarding.  Ext: No clubbing, cyanosis, edema.    EKG Interpretation  Interpreted by Kelly PERALTA  Rhythm: normal sinus   Rate: normal  Axis: normal  Ectopy: none  Conduction: normal  ST Segments: no acute change  T Waves: no " acute change  Q Waves:   Clinical Impression: no acute changes    Assessment & Plan:     53 y.o. female with the following -     1. Dyspnea on exertion  2. Swelling of both ankles  New issue. VSS. EKG NSR. Etiology unknown. Will order imaging to r/o CHF.    - EKG - Clinic Performed  - EC-ECHOCARDIOGRAM COMPLETE W/O CONT; Future    3. Pure hypercholesterolemia  Established condition. , goal is less than 100 to reduce risk of heart attack and strokes.   Her symptoms mentioned above is concerning for cardiac etiology.   Discussed starting rosuvastatin f5 mg daily for cholesterol management and to reduce risk of heart attack and stroke.  Medication administration and side effects discussed.  Pt agreeable to this.  Recommend Cardiac CT calcium score, a noninvasive way of evaluation the amount of calcified plaque development in heart vessels.  Recommend healthy diet, regular exercise, and healthy weight loss.  ED symptoms discussed.    - CT-HEART W/O CONT EVAL CALCIUM; Future  - rosuvastatin (CRESTOR) 5 MG Tab; Take 1 Tablet by mouth every evening.  Dispense: 30 Tablet; Refill: 11  - Semaglutide (WEGOVY) 1 MG/0.5ML Solution Auto-injector Pen-injector; Inject 0.5 mL under the skin every 7 days.  Dispense: 2 mL; Refill: 3    4. Prediabetes  Established condition.  A1c 5.8 today. Patient counseled on lifestyle modification that include healthy diet, increase physical activity, and maintain normal BMI to help reduce her risk of progression to diabetes. Discussed diabetes sequala both in the microvascular and macrovascular level with patient.    - POCT Hemoglobin  - Semaglutide (WEGOVY) 1 MG/0.5ML Solution Auto-injector Pen-injector; Inject 0.5 mL under the skin every 7 days.  Dispense: 2 mL; Refill: 3    5. Obesity (BMI 30.0-34.9)    Obesity is a disease associated with a significant increase in mortality and many health risks, including type 2 diabetes mellitus, hypertension, dyslipidemia, and coronary heart disease,  sleep apnea, depression, quality of life, physical functioning, and mobility.   Pure hypercholesterolemia increases patient's risk for heart attacks and strokes.   Semaglutide has shown substantial efficacy for weight reduction in individuals with obesity as well as reduction in risk factors for cardiovascular disease reduce major cardiovascular events in adults.  Will restart semagluride at 1 mg weekly dose.   Med administration and side effects discussed.    - Semaglutide (WEGOVY) 1 MG/0.5ML Solution Auto-injector Pen-injector; Inject 0.5 mL under the skin every 7 days.  Dispense: 2 mL; Refill: 3    Medical Decision Making/Course:  In the course of preparing for this visit with review of the pertinent past medical history, recent and past clinic visits, current medications, and performing chart, immunization, medical history and medication reconciliation, and in the further course of obtaining the current history pertinent to the clinic visit today, performing an exam and evaluation, ordering and independently evaluating labs, tests, and/or procedures, prescribing any recommended new medications as noted above, providing any pertinent counseling and education and recommending further coordination of care. This was discussed with patient in a shared-decision making conversation, and they understand and agreed with plan of care.     Return if symptoms worsen or fail to improve.    ROCÍO Valenzuela.   [facility]    Please note that this dictation was created using voice recognition software. I have made every reasonable attempt to correct obvious errors, but I expect that there are errors of grammar and possibly content that I did not discover before finalizing the note.

## 2024-07-09 ENCOUNTER — HOSPITAL ENCOUNTER (OUTPATIENT)
Dept: CARDIOLOGY | Facility: MEDICAL CENTER | Age: 54
End: 2024-07-09
Payer: COMMERCIAL

## 2024-07-10 ENCOUNTER — HOSPITAL ENCOUNTER (OUTPATIENT)
Dept: CARDIOLOGY | Facility: MEDICAL CENTER | Age: 54
End: 2024-07-10
Payer: COMMERCIAL

## 2024-07-10 DIAGNOSIS — M25.471 SWELLING OF BOTH ANKLES: ICD-10-CM

## 2024-07-10 DIAGNOSIS — R06.09 DYSPNEA ON EXERTION: ICD-10-CM

## 2024-07-10 DIAGNOSIS — M25.472 SWELLING OF BOTH ANKLES: ICD-10-CM

## 2024-07-10 LAB
LV EJECT FRACT  99904: 65
LV EJECT FRACT MOD 2C 99903: 67.58
LV EJECT FRACT MOD 4C 99902: 61.87
LV EJECT FRACT MOD BP 99901: 66.12

## 2024-07-10 PROCEDURE — 93306 TTE W/DOPPLER COMPLETE: CPT

## 2024-07-10 PROCEDURE — 93306 TTE W/DOPPLER COMPLETE: CPT | Mod: 26 | Performed by: INTERNAL MEDICINE

## 2024-08-23 ENCOUNTER — HOSPITAL ENCOUNTER (OUTPATIENT)
Dept: LAB | Facility: MEDICAL CENTER | Age: 54
End: 2024-08-23
Payer: COMMERCIAL

## 2024-08-23 DIAGNOSIS — R74.8 ELEVATED LIVER ENZYMES: ICD-10-CM

## 2024-08-23 DIAGNOSIS — E78.00 HYPERCHOLESTEROLEMIA: ICD-10-CM

## 2024-08-23 DIAGNOSIS — R73.03 PREDIABETES: ICD-10-CM

## 2024-08-23 LAB
ALBUMIN SERPL BCP-MCNC: 4.3 G/DL (ref 3.2–4.9)
ALP SERPL-CCNC: 108 U/L (ref 30–99)
ALT SERPL-CCNC: 62 U/L (ref 2–50)
AST SERPL-CCNC: 29 U/L (ref 12–45)
BILIRUB CONJ SERPL-MCNC: <0.2 MG/DL (ref 0.1–0.5)
BILIRUB INDIRECT SERPL-MCNC: ABNORMAL MG/DL (ref 0–1)
BILIRUB SERPL-MCNC: 0.4 MG/DL (ref 0.1–1.5)
CHOLEST SERPL-MCNC: 191 MG/DL (ref 100–199)
EST. AVERAGE GLUCOSE BLD GHB EST-MCNC: 117 MG/DL
FASTING STATUS PATIENT QL REPORTED: NORMAL
HBA1C MFR BLD: 5.7 % (ref 4–5.6)
HDLC SERPL-MCNC: 44 MG/DL
LDLC SERPL CALC-MCNC: 121 MG/DL
PROT SERPL-MCNC: 7 G/DL (ref 6–8.2)
TRIGL SERPL-MCNC: 131 MG/DL (ref 0–149)

## 2024-08-23 PROCEDURE — 80076 HEPATIC FUNCTION PANEL: CPT

## 2024-08-23 PROCEDURE — 36415 COLL VENOUS BLD VENIPUNCTURE: CPT

## 2024-08-23 PROCEDURE — 80061 LIPID PANEL: CPT

## 2024-08-23 PROCEDURE — 83036 HEMOGLOBIN GLYCOSYLATED A1C: CPT

## 2024-09-22 DIAGNOSIS — K22.70 BARRETT'S ESOPHAGUS WITHOUT DYSPLASIA: ICD-10-CM

## 2024-09-23 RX ORDER — PANTOPRAZOLE SODIUM 20 MG/1
20 TABLET, DELAYED RELEASE ORAL
Qty: 90 TABLET | Refills: 2 | Status: SHIPPED | OUTPATIENT
Start: 2024-09-23

## 2024-09-23 NOTE — TELEPHONE ENCOUNTER
Received request via: Pharmacy    Was the patient seen in the last year in this department? Yes    Does the patient have an active prescription (recently filled or refills available) for medication(s) requested? No    Pharm[facility] Name: [facility]    Does the patient have FDC Plus and need 100-day supply? (This applies to ALL medications) Patient does not have SCP

## 2024-10-04 ENCOUNTER — APPOINTMENT (OUTPATIENT)
Dept: URGENT CARE | Facility: PHYSICIAN GROUP | Age: 54
End: 2024-10-04
Payer: COMMERCIAL

## 2024-10-04 ENCOUNTER — APPOINTMENT (OUTPATIENT)
Dept: RADIOLOGY | Facility: IMAGING CENTER | Age: 54
End: 2024-10-04
Payer: COMMERCIAL

## 2024-10-04 ENCOUNTER — OCCUPATIONAL MEDICINE (OUTPATIENT)
Dept: URGENT CARE | Facility: PHYSICIAN GROUP | Age: 54
End: 2024-10-04
Payer: COMMERCIAL

## 2024-10-04 VITALS
SYSTOLIC BLOOD PRESSURE: 124 MMHG | DIASTOLIC BLOOD PRESSURE: 60 MMHG | RESPIRATION RATE: 16 BRPM | TEMPERATURE: 98.6 F | HEIGHT: 64 IN | BODY MASS INDEX: 29.88 KG/M2 | OXYGEN SATURATION: 99 % | HEART RATE: 59 BPM | WEIGHT: 175 LBS

## 2024-10-04 DIAGNOSIS — S89.91XA INJURY OF RIGHT KNEE, INITIAL ENCOUNTER: ICD-10-CM
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
## 2024-10-04 DIAGNOSIS — S99.911A INJURY OF RIGHT ANKLE, INITIAL ENCOUNTER: ICD-10-CM

## 2024-10-04 DIAGNOSIS — S96.911A STRAIN OF RIGHT ANKLE, INITIAL ENCOUNTER: ICD-10-CM

## 2024-10-04 PROCEDURE — 3074F SYST BP LT 130 MM HG: CPT

## 2024-10-04 PROCEDURE — 73564 X-RAY EXAM KNEE 4 OR MORE: CPT | Mod: TC,RT

## 2024-10-04 PROCEDURE — 99214 OFFICE O/P EST MOD 30 MIN: CPT

## 2024-10-04 PROCEDURE — 73610 X-RAY EXAM OF ANKLE: CPT | Mod: TC,RT

## 2024-10-04 PROCEDURE — 3078F DIAST BP <80 MM HG: CPT

## 2024-10-04 ASSESSMENT — FIBROSIS 4 INDEX: FIB4 SCORE: 0.57

## 2025-03-11 ENCOUNTER — APPOINTMENT (OUTPATIENT)
Dept: MEDICAL GROUP | Facility: IMAGING CENTER | Age: 55
End: 2025-03-11
Payer: COMMERCIAL

## 2025-03-11 VITALS
HEART RATE: 97 BPM | DIASTOLIC BLOOD PRESSURE: 70 MMHG | SYSTOLIC BLOOD PRESSURE: 118 MMHG | TEMPERATURE: 98.7 F | OXYGEN SATURATION: 95 % | BODY MASS INDEX: 28.13 KG/M2 | WEIGHT: 164.8 LBS | HEIGHT: 64 IN | RESPIRATION RATE: 16 BRPM

## 2025-03-11 DIAGNOSIS — R73.03 PREDIABETES: ICD-10-CM

## 2025-03-11 DIAGNOSIS — Z12.4 SCREENING FOR CERVICAL CANCER: ICD-10-CM

## 2025-03-11 DIAGNOSIS — Z86.2 HISTORY OF IRON DEFICIENCY ANEMIA: ICD-10-CM

## 2025-03-11 DIAGNOSIS — Z00.00 WELLNESS EXAMINATION: ICD-10-CM

## 2025-03-11 DIAGNOSIS — K76.0 HEPATIC STEATOSIS: ICD-10-CM

## 2025-03-11 DIAGNOSIS — E66.3 OVERWEIGHT (BMI 25.0-29.9): ICD-10-CM

## 2025-03-11 DIAGNOSIS — N95.1 MENOPAUSAL SYMPTOM: ICD-10-CM

## 2025-03-11 DIAGNOSIS — H91.91 DECREASED HEARING OF RIGHT EAR: ICD-10-CM

## 2025-03-11 DIAGNOSIS — H93.11 TINNITUS OF RIGHT EAR: ICD-10-CM

## 2025-03-11 DIAGNOSIS — Z12.31 ENCOUNTER FOR SCREENING MAMMOGRAM FOR MALIGNANT NEOPLASM OF BREAST: ICD-10-CM

## 2025-03-11 DIAGNOSIS — Z71.3 WEIGHT LOSS COUNSELING, ENCOUNTER FOR: ICD-10-CM

## 2025-03-11 DIAGNOSIS — R74.8 ELEVATED LIVER ENZYMES: ICD-10-CM

## 2025-03-11 DIAGNOSIS — E55.9 VITAMIN D INSUFFICIENCY: ICD-10-CM

## 2025-03-11 PROCEDURE — 3078F DIAST BP <80 MM HG: CPT

## 2025-03-11 PROCEDURE — 3074F SYST BP LT 130 MM HG: CPT

## 2025-03-11 PROCEDURE — 1126F AMNT PAIN NOTED NONE PRSNT: CPT

## 2025-03-11 PROCEDURE — 99214 OFFICE O/P EST MOD 30 MIN: CPT

## 2025-03-11 ASSESSMENT — PAIN SCALES - GENERAL: PAINLEVEL_OUTOF10: NO PAIN

## 2025-03-11 ASSESSMENT — FIBROSIS 4 INDEX: FIB4 SCORE: 0.57

## 2025-03-11 ASSESSMENT — PATIENT HEALTH QUESTIONNAIRE - PHQ9: CLINICAL INTERPRETATION OF PHQ2 SCORE: 0
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
## 2025-03-13 ENCOUNTER — HOSPITAL ENCOUNTER (OUTPATIENT)
Dept: LAB | Facility: MEDICAL CENTER | Age: 55
End: 2025-03-13
Payer: COMMERCIAL

## 2025-03-13 DIAGNOSIS — Z86.2 HISTORY OF IRON DEFICIENCY ANEMIA: ICD-10-CM

## 2025-03-13 DIAGNOSIS — R74.8 ELEVATED LIVER ENZYMES: ICD-10-CM

## 2025-03-13 DIAGNOSIS — E55.9 VITAMIN D INSUFFICIENCY: ICD-10-CM

## 2025-03-13 DIAGNOSIS — K76.0 HEPATIC STEATOSIS: ICD-10-CM

## 2025-03-13 DIAGNOSIS — N95.1 MENOPAUSAL SYMPTOM: ICD-10-CM

## 2025-03-13 DIAGNOSIS — Z00.00 WELLNESS EXAMINATION: ICD-10-CM

## 2025-03-13 DIAGNOSIS — E66.3 OVERWEIGHT (BMI 25.0-29.9): ICD-10-CM

## 2025-03-13 DIAGNOSIS — R73.03 PREDIABETES: ICD-10-CM

## 2025-03-13 LAB
25(OH)D3 SERPL-MCNC: 29 NG/ML (ref 30–100)
ALBUMIN SERPL BCP-MCNC: 4.3 G/DL (ref 3.2–4.9)
ALBUMIN/GLOB SERPL: 1.5 G/DL
ALP SERPL-CCNC: 87 U/L (ref 30–99)
ALT SERPL-CCNC: 32 U/L (ref 2–50)
ANION GAP SERPL CALC-SCNC: 12 MMOL/L (ref 7–16)
AST SERPL-CCNC: 30 U/L (ref 12–45)
BASOPHILS # BLD AUTO: 0.7 % (ref 0–1.8)
BASOPHILS # BLD: 0.05 K/UL (ref 0–0.12)
BILIRUB SERPL-MCNC: 0.5 MG/DL (ref 0.1–1.5)
BUN SERPL-MCNC: 12 MG/DL (ref 8–22)
CALCIUM ALBUM COR SERPL-MCNC: 9.7 MG/DL (ref 8.5–10.5)
CALCIUM SERPL-MCNC: 9.9 MG/DL (ref 8.5–10.5)
CHLORIDE SERPL-SCNC: 106 MMOL/L (ref 96–112)
CHOLEST SERPL-MCNC: 158 MG/DL (ref 100–199)
CO2 SERPL-SCNC: 22 MMOL/L (ref 20–33)
CORTIS SERPL-MCNC: 8.3 UG/DL (ref 0–23)
CREAT SERPL-MCNC: 0.73 MG/DL (ref 0.5–1.4)
EOSINOPHIL # BLD AUTO: 0.21 K/UL (ref 0–0.51)
EOSINOPHIL NFR BLD: 3.1 % (ref 0–6.9)
ERYTHROCYTE [DISTWIDTH] IN BLOOD BY AUTOMATED COUNT: 45.9 FL (ref 35.9–50)
EST. AVERAGE GLUCOSE BLD GHB EST-MCNC: 114 MG/DL
ESTRADIOL SERPL-MCNC: 11.4 PG/ML
FERRITIN SERPL-MCNC: 249 NG/ML (ref 10–291)
FSH SERPL-ACNC: 74.4 MIU/ML
GFR SERPLBLD CREATININE-BSD FMLA CKD-EPI: 98 ML/MIN/1.73 M 2
GLOBULIN SER CALC-MCNC: 2.9 G/DL (ref 1.9–3.5)
GLUCOSE SERPL-MCNC: 76 MG/DL (ref 65–99)
HBA1C MFR BLD: 5.6 % (ref 4–5.6)
HCT VFR BLD AUTO: 46.1 % (ref 37–47)
HDLC SERPL-MCNC: 43 MG/DL
HGB BLD-MCNC: 15.3 G/DL (ref 12–16)
IMM GRANULOCYTES # BLD AUTO: 0.01 K/UL (ref 0–0.11)
IMM GRANULOCYTES NFR BLD AUTO: 0.1 % (ref 0–0.9)
IRON SATN MFR SERPL: 34 % (ref 15–55)
IRON SERPL-MCNC: 98 UG/DL (ref 40–170)
LDLC SERPL CALC-MCNC: 87 MG/DL
LH SERPL-ACNC: 41.9 IU/L
LYMPHOCYTES # BLD AUTO: 2.68 K/UL (ref 1–4.8)
LYMPHOCYTES NFR BLD: 39.1 % (ref 22–41)
MCH RBC QN AUTO: 31.5 PG (ref 27–33)
MCHC RBC AUTO-ENTMCNC: 33.2 G/DL (ref 32.2–35.5)
MCV RBC AUTO: 94.9 FL (ref 81.4–97.8)
MONOCYTES # BLD AUTO: 0.36 K/UL (ref 0–0.85)
MONOCYTES NFR BLD AUTO: 5.2 % (ref 0–13.4)
NEUTROPHILS # BLD AUTO: 3.55 K/UL (ref 1.82–7.42)
NEUTROPHILS NFR BLD: 51.8 % (ref 44–72)
NRBC # BLD AUTO: 0 K/UL
NRBC BLD-RTO: 0 /100 WBC (ref 0–0.2)
PLATELET # BLD AUTO: 307 K/UL (ref 164–446)
PMV BLD AUTO: 10 FL (ref 9–12.9)
POTASSIUM SERPL-SCNC: 4.2 MMOL/L (ref 3.6–5.5)
PROGEST SERPL-MCNC: <0.1 NG/ML
PROT SERPL-MCNC: 7.2 G/DL (ref 6–8.2)
RBC # BLD AUTO: 4.86 M/UL (ref 4.2–5.4)
SODIUM SERPL-SCNC: 140 MMOL/L (ref 135–145)
TIBC SERPL-MCNC: 290 UG/DL (ref 250–450)
TRIGL SERPL-MCNC: 140 MG/DL (ref 0–149)
TSH SERPL DL<=0.005 MIU/L-ACNC: 1.91 UIU/ML (ref 0.38–5.33)
UIBC SERPL-MCNC: 192 UG/DL (ref 110–370)
WBC # BLD AUTO: 6.9 K/UL (ref 4.8–10.8)

## 2025-03-13 PROCEDURE — 80061 LIPID PANEL: CPT

## 2025-03-13 PROCEDURE — 83540 ASSAY OF IRON: CPT

## 2025-03-13 PROCEDURE — 82728 ASSAY OF FERRITIN: CPT

## 2025-03-13 PROCEDURE — 83036 HEMOGLOBIN GLYCOSYLATED A1C: CPT

## 2025-03-13 PROCEDURE — 84402 ASSAY OF FREE TESTOSTERONE: CPT

## 2025-03-13 PROCEDURE — 80053 COMPREHEN METABOLIC PANEL: CPT

## 2025-03-13 PROCEDURE — 83550 IRON BINDING TEST: CPT

## 2025-03-13 PROCEDURE — 84443 ASSAY THYROID STIM HORMONE: CPT

## 2025-03-13 PROCEDURE — 84144 ASSAY OF PROGESTERONE: CPT

## 2025-03-13 PROCEDURE — 82670 ASSAY OF TOTAL ESTRADIOL: CPT

## 2025-03-13 PROCEDURE — 85025 COMPLETE CBC W/AUTO DIFF WBC: CPT

## 2025-03-13 PROCEDURE — 82306 VITAMIN D 25 HYDROXY: CPT

## 2025-03-13 PROCEDURE — 83002 ASSAY OF GONADOTROPIN (LH): CPT

## 2025-03-13 PROCEDURE — 36415 COLL VENOUS BLD VENIPUNCTURE: CPT

## 2025-03-13 PROCEDURE — 84270 ASSAY OF SEX HORMONE GLOBUL: CPT

## 2025-03-13 PROCEDURE — 82533 TOTAL CORTISOL: CPT

## 2025-03-13 PROCEDURE — 83001 ASSAY OF GONADOTROPIN (FSH): CPT

## 2025-03-13 PROCEDURE — 84403 ASSAY OF TOTAL TESTOSTERONE: CPT

## 2025-03-14 ENCOUNTER — HOSPITAL ENCOUNTER (OUTPATIENT)
Dept: RADIOLOGY | Facility: MEDICAL CENTER | Age: 55
End: 2025-03-14
Payer: COMMERCIAL

## 2025-03-14 ENCOUNTER — RESULTS FOLLOW-UP (OUTPATIENT)
Dept: MEDICAL GROUP | Facility: IMAGING CENTER | Age: 55
End: 2025-03-14
Payer: COMMERCIAL

## 2025-03-14 DIAGNOSIS — Z12.31 ENCOUNTER FOR SCREENING MAMMOGRAM FOR MALIGNANT NEOPLASM OF BREAST: ICD-10-CM

## 2025-03-14 PROCEDURE — 77067 SCR MAMMO BI INCL CAD: CPT

## 2025-03-14 NOTE — Clinical Note
REFERRAL APPROVAL NOTICE         Sent on March 14, 2025                   Gely Cottrell  [address]  [place] [address]                   Dear Ms. Cottrell,    After a careful review of the medical information and benefit coverage, [facility] has processed your referral. See below for additional details.    If applicable, you must be actively enrolled with your insurance for coverage of the authorized service. If you have any questions regarding your coverage, please contact your insurance directly.    REFERRAL INFORMATION   Referral #:  [id number]  Referred-To Provider    Referred-By Provider:  Otolaryngology    TIEN Valenzuela   [facility]      [address] [place] [address]  [phone] [address] [place] [address]  [phone]    Referral Start Date:  03/11/2025  Referral End Date:   03/11/2026             SCHEDULING  If you do not already have an appointment, please call [phone] to make an appointment.     MORE INFORMATION  If you do not already have a [employer] account, sign up at: [email or web address]  You can access your medical information, make appointments, see lab results, billing information, and more.  If you have questions regarding this referral, please contact  the [facility] Referrals department at:             [phone]. Monday - Friday 8:00AM - 5:00PM.     Sincerely,    [facility]

## 2025-03-14 NOTE — Clinical Note
REFERRAL APPROVAL NOTICE         Sent on March 14, 2025                   Gely Cottrell  [address] [address]                   Dear Ms. Cottrell,    After a careful review of the medical information and benefit coverage, [facility] has processed your referral. See below for additional details.    If applicable, you must be actively enrolled with your insurance for coverage of the authorized service. If you have any questions regarding your coverage, please contact your insurance directly.    REFERRAL INFORMATION   Referral #:  [id number]  Referred-To Provider    Referred-By Provider:  Bariatric Medicine    TIEN Valenzuela   [facility]      [address] [place] [address]  [phone] [address] [place] [address]  [phone]    Referral Start Date:  03/11/2025  Referral End Date:   03/11/2026             SCHEDULING  If you do not already have an appointment, please call [phone] to make an appointment.     MORE INFORMATION  If you do not already have a [employer] account, sign up at: [email or web address]  You can access your medical information, make appointments, see lab results, billing information, and more.  If you have questions regarding this referral, please contact  the [facility] Referrals department at:             [phone]. Monday - Friday 8:00AM - 5:00PM.     Sincerely,    [facility]

## 2025-03-14 NOTE — Clinical Note
REFERRAL APPROVAL NOTICE         Sent on March 14, 2025                   Gely Cottrell  [address]  [place] [address]                   Dear Ms. Cottrell,    After a careful review of the medical information and benefit coverage, [facility] has processed your referral. See below for additional details.    If applicable, you must be actively enrolled with your insurance for coverage of the authorized service. If you have any questions regarding your coverage, please contact your insurance directly.    REFERRAL INFORMATION   Referral #:  [id number]  Referred-To Department    Referred-By Provider:  Audiology    TIEN Valenzuela   [facility]      [address] [place] [id number] [phone] [address]  [place] [id number] [phone]    Referral Start Date:  03/11/2025  Referral End Date:   03/11/2026             SCHEDULING  If you do not already have an appointment, please call [phone] to make an appointment.     MORE INFORMATION  If you do not already have a "[email or web address] account, sign up at: [email or web address]  You can access your medical information, make appointments, see lab results, billing information, and more.  If you have questions regarding this referral, please contact  the [facility] Referrals department at:             [phone]. Monday - Friday 8:00AM - 5:00PM.     Sincerely,    [facility]

## 2025-03-18 ENCOUNTER — RESULTS FOLLOW-UP (OUTPATIENT)
Dept: MEDICAL GROUP | Facility: IMAGING CENTER | Age: 55
End: 2025-03-18
Payer: COMMERCIAL

## 2025-03-18 LAB
SHBG SERPL-SCNC: 42 NMOL/L (ref 17–125)
TESTOST FREE SERPL-MCNC: 1.6 PG/ML (ref 0.6–3.8)
TESTOST SERPL-MCNC: 11 NG/DL (ref 9–55)

## 2025-04-05 NOTE — PROGRESS NOTES
ANNUAL GYNECOLOGY VISIT  [facility]    Chief Complaint  Annual Exam  New Patient      Subjective   Gely Cottrell is a 54 y.o. female  who presents for annual exam. Patient's last menstrual period was 2021 (approximate).    Her main concern today are symptoms associated with menopause. She feels emotionally labile and fatigues easily. She's noticed increased vaginal dryness with decreased libido. She's been having hot flashes and night sweats disturbing her sleep. She's currently on Wegovy for weight loss as she's notcied significant weight gain since menopause. She's been using cold water and wet towels to cool down. Her last period was approximately two years ago. Denies any bleeding/spotting since that time.     Hypertension: no  Personal history of breast cancer: no  Family history of breast cancer: no  Personal or family history of VTE: no  History of stroke or TIA: no  Smoking: no  Liver or gallbladder disease: fatty liver, otherwise denies   Migraines with aura: no  Cardiovascular disease: no  Undiagnosed vaginal bleeding: no    Preventive Care   Immunization History   Administered Date(s) Administered    Influenza Vac Subunit Quad Inj (Pf) 2020     Last Pap: NILM, HPV neg (2021)  Any abnormal pap smears?  no  Last Mammogram: 3/17/25 wnl, dense breasts, US offered  Last Colonoscopy: normal per patient  Last DEXA: never     Obstetric History  OB History    Para Term  AB Living   4 3 2 1 1 3   SAB IAB Ectopic Molar Multiple Live Births    1    3      # Outcome Date GA Lbr Brad/2nd Weight Sex Type Anes PTL Lv   4 Term 97 40w0d  7 lb 14.4 oz M CS-Unspec  N PARISH   3  94 36w0d  6 lb 8 oz F CS-Unspec  N PARISH      Complications: Other Excessive Bleeding   2 Term 91 40w0d  7 lb F CS-Unspec  N PARISH      Complications: Failure to Progress in First Stage   1 IAB                Past Medical History  Past Medical History:   Diagnosis Date    Anemia  2002    Arthritis     Chest pain, rule out acute myocardial infarction 08/27/2015    GERD (gastroesophageal reflux disease) 08/29/2011    RUQ abdominal pain 08/29/2011       Past Surgical History  Past Surgical History:   Procedure Laterality Date    APPENDECTOMY      CHOLECYSTECTOMY      LUMPECTOMY      2003    PRIMARY C SECTION      repeat c section time 2    TUBAL COAGULATION LAPAROSCOPIC BILATERAL         Social History  Social History     Tobacco Use    Smoking status: Never    Smokeless tobacco: Never    Tobacco comments:     very occasional   Vaping Use    Vaping status: Never Used   Substance Use Topics    Alcohol use: Not Currently     Comment: socially    Drug use: Yes     Types: Marijuana     Comment: once a month        Family History  Family History   Problem Relation Age of Onset    Diabetes Mother     Hypertension Mother     Hyperlipidemia Mother     Stroke Mother     Heart Disease Mother 60    Hypertension Father     Cancer Paternal Uncle         several uncles iwth stomach cancer of some kind       Home Medications  Current Outpatient Medications   Medication Sig    progesterone (PROMETRIUM) 100 MG Cap Take 1 Capsule by mouth every evening.    Estradiol 0.025 MG/24HR PATCH BIWEEKLY Place 1 Patch on the skin two times a week.    pantoprazole (PROTONIX) 20 MG tablet TAKE 1 TABLET BY MOUTH EVERY DAY    Semaglutide (WEGOVY) 1 MG/0.5ML Solution Auto-injector Pen-injector Inject 0.5 mL under the skin every 7 days.    Fexofenadine HCl (ALLEGRA PO) Take  by mouth.    albuterol 108 (90 Base) MCG/ACT Aero Soln inhalation aerosol Inhale 2 Puffs every four hours as needed for Shortness of Breath.    rosuvastatin (CRESTOR) 5 MG Tab Take 1 Tablet by mouth every evening. (Patient not taking: Reported on 10/4/2024)       Allergies/Reactions  Allergies   Allergen Reactions    Seasonal        ROS   Review of Systems   All other systems reviewed and are negative.      Objective     /81 (BP Location: Right arm,  "Patient Position: Sitting, BP Cuff Size: Adult)   Ht 5' 4\"   Wt 165 lb   LMP 06/17/2021 (Approximate)   BMI 28.32 kg/m²     Physical Exam  Constitutional:       General: She is not in acute distress.     Appearance: Normal appearance. She is normal weight.   Genitourinary:      No lesions in the vagina.      Right Labia: No rash, tenderness or lesions.     Left Labia: No tenderness, lesions or rash.     No vaginal discharge, tenderness, bleeding or ulceration.        Right Adnexa: not tender, not full and no mass present.     Left Adnexa: not tender, not full and no mass present.     Cervical friability present.      No cervical lesion.      Uterus is not enlarged, tender or irregular.   Eyes:      Conjunctiva/sclera: Conjunctivae normal.   Abdominal:      General: There is no distension.      Palpations: Abdomen is soft.      Tenderness: There is no abdominal tenderness.   Neurological:      General: No focal deficit present.      Mental Status: She is alert.   Skin:     General: Skin is warm and dry.   Psychiatric:         Mood and Affect: Mood normal.         Behavior: Behavior normal.   Vitals and nursing note reviewed.         Labs & Imaging   Latest Reference Range & Units 03/13/25 10:08   Estradiol-E2 pg/mL 11.4   Follicle Stimulating Hormone mIU/mL 74.4   Luteinizing Hormone IU/L 41.9   Progesterone ng/mL <0.10   Sex Hormone Bind Globulin 17 - 125 nmol/L 42   Testosterone Fr LCMS 0.6 - 3.8 pg/mL 1.6   Testosterone,Total 9 - 55 ng/dL 11       Assessment & Plan     Gely Cottrell is a 54 y.o. female who presents today for Annual Gyn Exam.     #Health Maintenance   - Pap: collected  - STI screening: declines   - Mammogram: 3/17/25 wnl, dense breasts, US offered  - Colonoscopy: normal per patient   - Tobacco: nonsmoker  - Diabetes screening: per PCP  - Cholesterol screening: per PCP  - Counseling: mammography screening, use and side effects of HRT, and menopause    #Menopause Symptoms  - FSH/LH and " estrogen levels consistent with menopause  - Discussed various treatment options for including natural remedies, non-hormonal prescription medications, and hormonal prescription medications. Natural remedies include lifestyle modifications, avoiding caffeine, dressing in layers, fans, weight loss, exercise, soy products, and black cohosh. Non-hormonal prescriptions include Veozah and SSRIs/antidepressants. Hormonal prescriptions include estrogen/progesterone combined or estrogen alone.  - Discussed that benefits of HRT include improvement of vasomotor symptoms, prevention of osteoporosis, prevention of colon cancer, reduced risk of diabetes.  - Counseled that risks of HRT include increased change of blood clots, stroke, breast cancer, heart disease, endometrial cancer, heart attack, high triglycerides, especially in peole over the age of 60. HRT can also have side effects including weight gain, bloating, breast tenderness or swelling, nausea, headaches, leg cramping, and vaginal bleeding.  - Discussed recommendation for lowest effective dose of HRT to treat symptoms yet minimize risks. Recommended treating for shortest duration of time, with regular evaluations to assess ongoing need and effectiveness.   - Discussed first-pass effect of liver and preference for using a transdermal or vaginal route of estrogen administration. Also discussed need for progesterone given she retains her uterus.Discussed that bleeding after menopause is considered abnormal and she should present for evaluation if it occurs.  - After thorough discussion of the available options, the patient elects for estrogen patch and oral progesterone. Rx for estradiol 0.025mg 2x weekly patch and prometrium 100mg nightly sent to pharmacy.    Return: 3 mo    Melinda Leon M.D.

## 2025-04-15 ENCOUNTER — OFFICE VISIT (OUTPATIENT)
Dept: OBGYN | Facility: CLINIC | Age: 55
End: 2025-04-15
Payer: COMMERCIAL

## 2025-04-15 ENCOUNTER — HOSPITAL ENCOUNTER (OUTPATIENT)
Facility: MEDICAL CENTER | Age: 55
End: 2025-04-15
Attending: STUDENT IN AN ORGANIZED HEALTH CARE EDUCATION/TRAINING PROGRAM
Payer: COMMERCIAL

## 2025-04-15 VITALS
HEIGHT: 64 IN | SYSTOLIC BLOOD PRESSURE: 100 MMHG | BODY MASS INDEX: 28.17 KG/M2 | DIASTOLIC BLOOD PRESSURE: 81 MMHG | WEIGHT: 165 LBS

## 2025-04-15 DIAGNOSIS — Z01.419 WOMEN'S ANNUAL ROUTINE GYNECOLOGICAL EXAMINATION: ICD-10-CM

## 2025-04-15 DIAGNOSIS — Z12.4 SCREENING FOR MALIGNANT NEOPLASM OF CERVIX PERFORMED: ICD-10-CM

## 2025-04-15 DIAGNOSIS — N95.1 VASOMOTOR SYMPTOMS DUE TO MENOPAUSE: ICD-10-CM

## 2025-04-15 PROCEDURE — 99204 OFFICE O/P NEW MOD 45 MIN: CPT | Mod: 25 | Performed by: STUDENT IN AN ORGANIZED HEALTH CARE EDUCATION/TRAINING PROGRAM

## 2025-04-15 PROCEDURE — 3079F DIAST BP 80-89 MM HG: CPT | Performed by: STUDENT IN AN ORGANIZED HEALTH CARE EDUCATION/TRAINING PROGRAM

## 2025-04-15 PROCEDURE — 88142 CYTOPATH C/V THIN LAYER: CPT

## 2025-04-15 PROCEDURE — 99386 PREV VISIT NEW AGE 40-64: CPT | Performed by: STUDENT IN AN ORGANIZED HEALTH CARE EDUCATION/TRAINING PROGRAM

## 2025-04-15 PROCEDURE — 3074F SYST BP LT 130 MM HG: CPT | Performed by: STUDENT IN AN ORGANIZED HEALTH CARE EDUCATION/TRAINING PROGRAM

## 2025-04-15 PROCEDURE — 87624 HPV HI-RISK TYP POOLED RSLT: CPT

## 2025-04-15 RX ORDER — ESTRADIOL 0.03 MG/D
1 FILM, EXTENDED RELEASE TRANSDERMAL
Qty: 8 PATCH | Refills: 5 | Status: SHIPPED | OUTPATIENT
Start: 2025-04-15

## 2025-04-15 RX ORDER — PROGESTERONE 100 MG/1
100 CAPSULE ORAL NIGHTLY
Qty: 90 CAPSULE | Refills: 3 | Status: SHIPPED | OUTPATIENT
Start: 2025-04-15

## 2025-04-15 ASSESSMENT — FIBROSIS 4 INDEX: FIB4 SCORE: 0.92

## 2025-04-15 NOTE — PROGRESS NOTES
Patient here for annual exam  Last pap done/result:7/28/2021   LMP: Post menopausal   BCM:Tubal  Last mammogram, if applicable:  Phone number: [phone]  Pharmacy verified

## 2025-04-22 ENCOUNTER — OFFICE VISIT (OUTPATIENT)
Dept: URGENT CARE | Facility: PHYSICIAN GROUP | Age: 55
End: 2025-04-22
Payer: COMMERCIAL

## 2025-04-22 VITALS
RESPIRATION RATE: 16 BRPM | TEMPERATURE: 99.1 F | HEART RATE: 97 BPM | DIASTOLIC BLOOD PRESSURE: 64 MMHG | SYSTOLIC BLOOD PRESSURE: 92 MMHG | OXYGEN SATURATION: 99 % | BODY MASS INDEX: 27.49 KG/M2 | WEIGHT: 161 LBS | HEIGHT: 64 IN

## 2025-04-22 DIAGNOSIS — G44.209 ACUTE NON INTRACTABLE TENSION-TYPE HEADACHE: ICD-10-CM

## 2025-04-22 PROCEDURE — 3074F SYST BP LT 130 MM HG: CPT

## 2025-04-22 PROCEDURE — 3078F DIAST BP <80 MM HG: CPT

## 2025-04-22 PROCEDURE — 99214 OFFICE O/P EST MOD 30 MIN: CPT | Mod: 25

## 2025-04-22 RX ORDER — DEXAMETHASONE SODIUM PHOSPHATE 10 MG/ML
10 INJECTION, SOLUTION INTRA-ARTICULAR; INTRALESIONAL; INTRAMUSCULAR; INTRAVENOUS; SOFT TISSUE ONCE
Status: COMPLETED | OUTPATIENT
Start: 2025-04-22 | End: 2025-04-22

## 2025-04-22 RX ORDER — ONDANSETRON 4 MG/1
4 TABLET, ORALLY DISINTEGRATING ORAL ONCE
Status: COMPLETED | OUTPATIENT
Start: 2025-04-22 | End: 2025-04-22

## 2025-04-22 RX ORDER — KETOROLAC TROMETHAMINE 15 MG/ML
15 INJECTION, SOLUTION INTRAMUSCULAR; INTRAVENOUS ONCE
Status: COMPLETED | OUTPATIENT
Start: 2025-04-22 | End: 2025-04-22

## 2025-04-22 RX ADMIN — DEXAMETHASONE SODIUM PHOSPHATE 10 MG: 10 INJECTION, SOLUTION INTRA-ARTICULAR; INTRALESIONAL; INTRAMUSCULAR; INTRAVENOUS; SOFT TISSUE at 13:32

## 2025-04-22 RX ADMIN — KETOROLAC TROMETHAMINE 15 MG: 15 INJECTION, SOLUTION INTRAMUSCULAR; INTRAVENOUS at 13:32

## 2025-04-22 RX ADMIN — ONDANSETRON 4 MG: 4 TABLET, ORALLY DISINTEGRATING ORAL at 13:32

## 2025-04-22 ASSESSMENT — FIBROSIS 4 INDEX: FIB4 SCORE: 0.92

## 2025-04-22 NOTE — PROGRESS NOTES
"Chief Complaint   Patient presents with    Head Ache     X5 days          Subjective:   HISTORY OF PRESENT ILLNESS: Gely Cottrell is a 54 y.o. female who presents for 5 days of headache.  Described as a tight band around her head.  No hx of migraine or tension like headache.  She will get occasional headache which is usually relieved by tylenol.   This head ache came on gradually.  She has no numbness or tingling, no blurry vision, no difficulty with speech or gait.  She tells me she just started HRT about 3 days prior to her headache starting.  She is having some nausea but without vomiting..     Medications, Allergies, current problem list, Social and Family history reviewed today in Epic.     Objective:     BP 92/64 (BP Location: Right arm, Patient Position: Sitting, BP Cuff Size: Adult)   Pulse 97   Temp 37.3 °C (99.1 °F) (Temporal)   Resp 16   Ht 1.626 m (5' 4\")   Wt 73 kg (161 lb)   SpO2 99%     Physical Exam  Vitals reviewed.   Constitutional:       General: She is not in acute distress.     Appearance: Normal appearance. She is ill-appearing.   HENT:      Right Ear: Tympanic membrane normal.      Left Ear: Tympanic membrane normal.      Mouth/Throat:      Mouth: Mucous membranes are moist.   Eyes:      Extraocular Movements: Extraocular movements intact.      Pupils: Pupils are equal, round, and reactive to light.   Cardiovascular:      Rate and Rhythm: Normal rate.   Pulmonary:      Effort: Pulmonary effort is normal.   Skin:     General: Skin is warm and dry.      Capillary Refill: Capillary refill takes less than 2 seconds.   Neurological:      General: No focal deficit present.      Mental Status: She is alert and oriented to person, place, and time.      Cranial Nerves: No cranial nerve deficit.      Sensory: No sensory deficit.      Motor: No weakness.      Coordination: Coordination normal.      Gait: Gait normal.   Psychiatric:         Mood and Affect: Mood normal.      "     Assessment/Plan:     Diagnosis and associated orders    I personally reviewed prior external notes and test results pertinent to today's visit.     1. Acute non intractable tension-type headache  ketorolac (Toradol) 15 MG/ML injection 15 mg    dexamethasone (Decadron) injection (check route below) 10 mg    ondansetron (Zofran ODT) dispertab 4 mg            IMPRESSION: The patient is clearly uncomfortable but with reassuring exam and vitals signs. Symptomatology is consistent with tension like headache. She does not have a hx of headache but no focal deficits and no concerning feature such as thunderclap onset.  She did begin an estrogen patch and Prometrium about 3 days prior to onset which could be contributing to her headache. Unlikely to have developed a blood clot so quickly but that possibility was discussed.  I advised her if she has no relief in her headache despite the medication we gave her today or if her headache returns she should be evaluated in the ED.  I also advised her to take the patch off and stop the progesterone for now.  She may begin again next week after her headache has resolved.  If she get a headache again she can be more sure this is the cause and she should FU with her GYN for alternatives  Discussed anticipated duration of illness, return to work/school, and indications to RTC or go to the Emergency department.    Patient states understanding of the plan of care and discharge instructions.  They are discharged in stable condition.    My total time spent caring for the patient on the day of the encounter was 30 minutes.   This does not include time spent on separately billable procedures/tests.          Please note that this dictation was created using voice recognition software. I have made a reasonable attempt to correct obvious errors, but I expect that there are errors of grammar and possibly content that I did not discover before finalizing the note.    This note was  electronically signed by TIEN Guzmán

## 2025-04-24 ENCOUNTER — RESULTS FOLLOW-UP (OUTPATIENT)
Dept: OBGYN | Facility: MEDICAL CENTER | Age: 55
End: 2025-04-24

## 2025-04-24 LAB
HPV I/H RISK 1 DNA SPEC QL NAA+PROBE: NOT DETECTED
SPECIMEN SOURCE: NORMAL
THINPREP PAP, CYTOLOGY NL11781: NORMAL

## 2025-05-09 DIAGNOSIS — M79.605 PAIN OF LEFT LOWER EXTREMITY: ICD-10-CM

## 2025-05-09 DIAGNOSIS — Z79.890 HORMONE REPLACEMENT THERAPY (HRT): ICD-10-CM

## 2025-07-30 ENCOUNTER — OFFICE VISIT (OUTPATIENT)
Dept: MEDICAL GROUP | Facility: IMAGING CENTER | Age: 55
End: 2025-07-30
Payer: COMMERCIAL

## 2025-07-30 VITALS
DIASTOLIC BLOOD PRESSURE: 72 MMHG | TEMPERATURE: 98.6 F | BODY MASS INDEX: 29.06 KG/M2 | SYSTOLIC BLOOD PRESSURE: 92 MMHG | WEIGHT: 170.2 LBS | OXYGEN SATURATION: 96 % | HEART RATE: 90 BPM | RESPIRATION RATE: 16 BRPM | HEIGHT: 64 IN

## 2025-07-30 DIAGNOSIS — S13.4XXS WHIPLASH INJURIES, SEQUELA: ICD-10-CM

## 2025-07-30 DIAGNOSIS — M25.511 RIGHT SHOULDER PAIN, UNSPECIFIED CHRONICITY: ICD-10-CM

## 2025-07-30 DIAGNOSIS — M54.2 NECK PAIN ON RIGHT SIDE: ICD-10-CM

## 2025-07-30 DIAGNOSIS — Z04.1 ENCOUNTER FOR EXAMINATION FOLLOWING MOTOR VEHICLE ACCIDENT (MVA): Primary | ICD-10-CM

## 2025-07-30 RX ORDER — CYCLOBENZAPRINE HCL 10 MG
10 TABLET ORAL 3 TIMES DAILY PRN
COMMUNITY
End: 2025-07-30 | Stop reason: SDUPTHER

## 2025-07-30 RX ORDER — DICLOFENAC SODIUM 75 MG/1
75 TABLET, DELAYED RELEASE ORAL 2 TIMES DAILY
Qty: 60 TABLET | Refills: 0 | Status: SHIPPED | OUTPATIENT
Start: 2025-07-30

## 2025-07-30 RX ORDER — CYCLOBENZAPRINE HCL 10 MG
10 TABLET ORAL 3 TIMES DAILY PRN
Qty: 30 TABLET | Refills: 1 | Status: SHIPPED | OUTPATIENT
Start: 2025-07-30

## 2025-07-30 ASSESSMENT — FIBROSIS 4 INDEX: FIB4 SCORE: 0.93

## 2025-07-30 ASSESSMENT — PAIN SCALES - GENERAL: PAINLEVEL_OUTOF10: 6=MODERATE PAIN
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
## 2025-07-31 ENCOUNTER — HOSPITAL ENCOUNTER (OUTPATIENT)
Dept: RADIOLOGY | Facility: MEDICAL CENTER | Age: 55
End: 2025-07-31
Payer: COMMERCIAL

## 2025-07-31 DIAGNOSIS — Z04.1 ENCOUNTER FOR EXAMINATION FOLLOWING MOTOR VEHICLE ACCIDENT (MVA): ICD-10-CM

## 2025-07-31 DIAGNOSIS — S13.4XXS WHIPLASH INJURIES, SEQUELA: ICD-10-CM

## 2025-07-31 DIAGNOSIS — M54.2 NECK PAIN ON RIGHT SIDE: ICD-10-CM

## 2025-07-31 PROCEDURE — 72040 X-RAY EXAM NECK SPINE 2-3 VW: CPT

## 2025-08-04 ENCOUNTER — RESULTS FOLLOW-UP (OUTPATIENT)
Dept: MEDICAL GROUP | Facility: IMAGING CENTER | Age: 55
End: 2025-08-04
Payer: COMMERCIAL

## 2025-08-13 ENCOUNTER — HOSPITAL ENCOUNTER (EMERGENCY)
Facility: MEDICAL CENTER | Age: 55
End: 2025-08-13
Attending: EMERGENCY MEDICINE
Payer: COMMERCIAL

## 2025-08-13 VITALS
OXYGEN SATURATION: 97 % | WEIGHT: 163 LBS | TEMPERATURE: 97.9 F | DIASTOLIC BLOOD PRESSURE: 85 MMHG | HEART RATE: 98 BPM | RESPIRATION RATE: 16 BRPM | HEIGHT: 64 IN | BODY MASS INDEX: 27.83 KG/M2 | SYSTOLIC BLOOD PRESSURE: 121 MMHG

## 2025-08-13 DIAGNOSIS — S39.012A STRAIN OF LUMBAR REGION, INITIAL ENCOUNTER: ICD-10-CM
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
## 2025-08-13 DIAGNOSIS — S16.1XXA STRAIN OF NECK MUSCLE, INITIAL ENCOUNTER: ICD-10-CM

## 2025-08-13 PROCEDURE — 700102 HCHG RX REV CODE 250 W/ 637 OVERRIDE(OP): Performed by: EMERGENCY MEDICINE

## 2025-08-13 PROCEDURE — A9270 NON-COVERED ITEM OR SERVICE: HCPCS | Performed by: EMERGENCY MEDICINE

## 2025-08-13 PROCEDURE — 99284 EMERGENCY DEPT VISIT MOD MDM: CPT

## 2025-08-13 PROCEDURE — 700101 HCHG RX REV CODE 250: Performed by: EMERGENCY MEDICINE

## 2025-08-13 RX ORDER — ACETAMINOPHEN 500 MG
1000 TABLET ORAL ONCE
Status: COMPLETED | OUTPATIENT
Start: 2025-08-13 | End: 2025-08-13

## 2025-08-13 RX ORDER — LIDOCAINE 4 G/G
2 PATCH TOPICAL DAILY
Status: DISCONTINUED | OUTPATIENT
Start: 2025-08-13 | End: 2025-08-13 | Stop reason: HOSPADM

## 2025-08-13 RX ORDER — IBUPROFEN 600 MG/1
600 TABLET, FILM COATED ORAL ONCE
Status: COMPLETED | OUTPATIENT
Start: 2025-08-13 | End: 2025-08-13

## 2025-08-13 RX ORDER — LIDOCAINE 4 G/G
2 PATCH TOPICAL EVERY 24 HOURS
Qty: 30 PATCH | Refills: 0 | Status: SHIPPED | OUTPATIENT
Start: 2025-08-13

## 2025-08-13 RX ADMIN — ACETAMINOPHEN 1000 MG: 500 TABLET ORAL at 15:10

## 2025-08-13 RX ADMIN — IBUPROFEN 600 MG: 600 TABLET ORAL at 15:17

## 2025-08-13 RX ADMIN — LIDOCAINE 2 PATCH: 4 PATCH TOPICAL at 15:11

## 2025-08-13 ASSESSMENT — FIBROSIS 4 INDEX: FIB4 SCORE: 0.93

## 2025-08-13 ASSESSMENT — PAIN DESCRIPTION - PAIN TYPE: TYPE: ACUTE PAIN

## 2025-08-17 ENCOUNTER — HOSPITAL ENCOUNTER (OUTPATIENT)
Dept: RADIOLOGY | Facility: MEDICAL CENTER | Age: 55
End: 2025-08-17
Payer: COMMERCIAL

## 2025-08-17 DIAGNOSIS — M54.2 NECK PAIN ON RIGHT SIDE: ICD-10-CM

## 2025-08-17 DIAGNOSIS — Z04.1 ENCOUNTER FOR EXAMINATION FOLLOWING MOTOR VEHICLE ACCIDENT (MVA): ICD-10-CM

## 2025-08-17 DIAGNOSIS — S13.4XXS WHIPLASH INJURIES, SEQUELA: ICD-10-CM

## 2025-08-28 DIAGNOSIS — Z04.1 ENCOUNTER FOR EXAMINATION FOLLOWING MOTOR VEHICLE ACCIDENT (MVA): ICD-10-CM

## 2025-08-28 DIAGNOSIS — M54.2 NECK PAIN ON RIGHT SIDE: ICD-10-CM

## 2025-08-28 DIAGNOSIS — M25.511 RIGHT SHOULDER PAIN, UNSPECIFIED CHRONICITY: ICD-10-CM

## 2025-08-28 DIAGNOSIS — S13.4XXS WHIPLASH INJURIES, SEQUELA: ICD-10-CM

## 2025-08-29 RX ORDER — DICLOFENAC SODIUM 75 MG/1
75 TABLET, DELAYED RELEASE ORAL 2 TIMES DAILY PRN
Qty: 60 TABLET | Refills: 0 | Status: SHIPPED | OUTPATIENT
Start: 2025-08-29